# Patient Record
Sex: FEMALE | Race: BLACK OR AFRICAN AMERICAN | URBAN - METROPOLITAN AREA
[De-identification: names, ages, dates, MRNs, and addresses within clinical notes are randomized per-mention and may not be internally consistent; named-entity substitution may affect disease eponyms.]

---

## 2017-05-03 ENCOUNTER — OFFICE VISIT (OUTPATIENT)
Dept: FAMILY MEDICINE | Facility: CLINIC | Age: 47
End: 2017-05-03

## 2017-05-03 ENCOUNTER — OFFICE VISIT (OUTPATIENT)
Dept: PHARMACY | Facility: CLINIC | Age: 47
End: 2017-05-03

## 2017-05-03 VITALS
HEIGHT: 67 IN | SYSTOLIC BLOOD PRESSURE: 180 MMHG | RESPIRATION RATE: 20 BRPM | DIASTOLIC BLOOD PRESSURE: 121 MMHG | HEART RATE: 72 BPM | TEMPERATURE: 98.4 F | BODY MASS INDEX: 32.55 KG/M2 | OXYGEN SATURATION: 96 % | WEIGHT: 207.4 LBS

## 2017-05-03 DIAGNOSIS — R32 URINARY INCONTINENCE, UNSPECIFIED TYPE: ICD-10-CM

## 2017-05-03 DIAGNOSIS — I10 ESSENTIAL HYPERTENSION: Primary | ICD-10-CM

## 2017-05-03 DIAGNOSIS — M19.90 OSTEOARTHRITIS, UNSPECIFIED OSTEOARTHRITIS TYPE, UNSPECIFIED SITE: ICD-10-CM

## 2017-05-03 DIAGNOSIS — E78.5 HYPERLIPIDEMIA LDL GOAL <130: ICD-10-CM

## 2017-05-03 DIAGNOSIS — J45.40 MODERATE PERSISTENT ASTHMA WITHOUT COMPLICATION: ICD-10-CM

## 2017-05-03 DIAGNOSIS — I10 ESSENTIAL HYPERTENSION: ICD-10-CM

## 2017-05-03 DIAGNOSIS — K21.9 GASTROESOPHAGEAL REFLUX DISEASE WITHOUT ESOPHAGITIS: ICD-10-CM

## 2017-05-03 DIAGNOSIS — N92.6 IRREGULAR MENSTRUAL CYCLE: ICD-10-CM

## 2017-05-03 DIAGNOSIS — G47.30 SLEEP APNEA, UNSPECIFIED TYPE: Primary | ICD-10-CM

## 2017-05-03 DIAGNOSIS — N39.45 CONTINUOUS LEAKAGE OF URINE: ICD-10-CM

## 2017-05-03 DIAGNOSIS — M79.673 PAIN OF FOOT, UNSPECIFIED LATERALITY: ICD-10-CM

## 2017-05-03 LAB
ALBUMIN SERPL-MCNC: 4.3 MG/DL (ref 3.8–5)
ALP SERPL-CCNC: 53.5 U/L (ref 31.7–110.5)
ALT SERPL-CCNC: 25.3 U/L (ref 0–45)
AST SERPL-CCNC: 13.6 U/L (ref 0–45)
BILIRUB SERPL-MCNC: <0.4 MG/DL (ref 0.2–1.3)
BUN SERPL-MCNC: 8.7 MG/DL (ref 7–19)
CALCIUM SERPL-MCNC: 9.6 MG/DL (ref 8.5–10.1)
CHLORIDE SERPLBLD-SCNC: 103.6 MMOL/L (ref 98–110)
CHOLEST SERPL-MCNC: 262.8 MG/DL (ref 0–200)
CHOLEST/HDLC SERPL: 7.2 {RATIO} (ref 0–5)
CO2 SERPL-SCNC: 26.3 MMOL/L (ref 20–32)
CREAT SERPL-MCNC: 0.7 MG/DL (ref 0.5–1)
CREAT UR-MCNC: 160 MG/DL
GFR SERPL CREATININE-BSD FRML MDRD: >90 ML/MIN/1.7 M2
GLUCOSE SERPL-MCNC: 109.8 MG'DL (ref 70–99)
HBA1C MFR BLD: 6.2 % (ref 4.1–5.7)
HDLC SERPL-MCNC: 36.4 MG/DL
LDLC SERPL CALC-MCNC: 191 MG/DL (ref 0–129)
MICROALBUMIN UR-MCNC: 93 MG/L
MICROALBUMIN/CREAT UR: 58.06 MG/G CR (ref 0–25)
POTASSIUM SERPL-SCNC: 3.7 MMOL/DL (ref 3.3–4.5)
PROT SERPL-MCNC: 7.7 G/DL (ref 6.8–8.8)
SODIUM SERPL-SCNC: 138.3 MMOL/L (ref 132.6–141.4)
TRIGL SERPL-MCNC: 178.7 MG/DL (ref 0–150)
VLDL CHOLESTEROL: 35.7 MG/DL (ref 7–32)

## 2017-05-03 RX ORDER — MULTIVIT-MIN/IRON/FOLIC ACID/K 18-600-40
1000 CAPSULE ORAL DAILY
Qty: 30 TABLET | Refills: 11 | Status: SHIPPED | OUTPATIENT
Start: 2017-05-03 | End: 2019-11-20

## 2017-05-03 RX ORDER — DIPHENHYD/PHENYLEPH/ACETAMINOP 12.5-5-325
TABLET ORAL
Qty: 1 KIT | Refills: 0 | Status: SHIPPED | OUTPATIENT
Start: 2017-05-03 | End: 2017-06-01

## 2017-05-03 RX ORDER — OXYBUTYNIN CHLORIDE 5 MG/1
5 TABLET, EXTENDED RELEASE ORAL DAILY
Qty: 30 TABLET | Refills: 3 | Status: SHIPPED | OUTPATIENT
Start: 2017-05-03 | End: 2019-11-20

## 2017-05-03 RX ORDER — OXYBUTYNIN CHLORIDE 5 MG/1
5 TABLET, EXTENDED RELEASE ORAL DAILY
COMMUNITY
End: 2017-05-03

## 2017-05-03 RX ORDER — NAPROXEN SODIUM 220 MG
TABLET ORAL
Qty: 60 TABLET | Refills: 3 | Status: SHIPPED | OUTPATIENT
Start: 2017-05-03 | End: 2019-11-20

## 2017-05-03 RX ORDER — ALBUTEROL SULFATE 90 UG/1
2 AEROSOL, METERED RESPIRATORY (INHALATION) EVERY 6 HOURS
COMMUNITY
End: 2019-11-20

## 2017-05-03 RX ORDER — ATORVASTATIN CALCIUM 40 MG/1
40 TABLET, FILM COATED ORAL DAILY
Qty: 30 TABLET | Refills: 11 | Status: SHIPPED | OUTPATIENT
Start: 2017-05-03 | End: 2019-11-20 | Stop reason: DRUGHIGH

## 2017-05-03 RX ORDER — AMLODIPINE BESYLATE 5 MG/1
5 TABLET ORAL DAILY
Qty: 30 TABLET | Refills: 1 | Status: SHIPPED | OUTPATIENT
Start: 2017-05-03 | End: 2019-11-20

## 2017-05-03 RX ORDER — HYDROCHLOROTHIAZIDE 12.5 MG/1
12.5 TABLET ORAL DAILY
Qty: 30 TABLET | Refills: 6 | Status: SHIPPED | OUTPATIENT
Start: 2017-05-03 | End: 2019-11-20

## 2017-05-03 RX ORDER — FLUTICASONE PROPIONATE 50 MCG
1-2 SPRAY, SUSPENSION (ML) NASAL DAILY
Qty: 1 BOTTLE | Refills: 11 | Status: SHIPPED | OUTPATIENT
Start: 2017-05-03 | End: 2019-11-20

## 2017-05-03 RX ORDER — ATORVASTATIN CALCIUM 10 MG/1
40 TABLET, FILM COATED ORAL DAILY
Qty: 30 TABLET | Refills: 11 | Status: SHIPPED | OUTPATIENT
Start: 2017-05-03 | End: 2017-05-03 | Stop reason: DRUGHIGH

## 2017-05-03 RX ORDER — FAMOTIDINE 10 MG
20 TABLET ORAL DAILY
Qty: 60 TABLET | Refills: 3 | Status: SHIPPED | OUTPATIENT
Start: 2017-05-03 | End: 2019-11-20

## 2017-05-03 RX ORDER — IPRATROPIUM BROMIDE AND ALBUTEROL SULFATE 2.5; .5 MG/3ML; MG/3ML
1 SOLUTION RESPIRATORY (INHALATION) EVERY 6 HOURS PRN
COMMUNITY
End: 2019-11-20

## 2017-05-03 ASSESSMENT — ENCOUNTER SYMPTOMS
COUGH: 0
FATIGUE: 1
NERVOUS/ANXIOUS: 1
ARTHRALGIAS: 1
FEVER: 0
DIZZINESS: 0
ACTIVITY CHANGE: 0
SLEEP DISTURBANCE: 0
DYSPHORIC MOOD: 1
BACK PAIN: 1
CHEST TIGHTNESS: 0
SHORTNESS OF BREATH: 0
NECK PAIN: 1
WHEEZING: 0
MYALGIAS: 1
APPETITE CHANGE: 0
WEAKNESS: 0
JOINT SWELLING: 1
PALPITATIONS: 0

## 2017-05-03 NOTE — PATIENT INSTRUCTIONS
Here is the plan from today's visit    1. Sleep apnea, unspecified type  Schedule appointment with sleep clinic  - SLEEP EVALUATION & MANAGEMENT REFERRAL - ADULT; Future    2. Hyperlipidemia LDL goal <130  Check labs today  Will refill medications  - Lipid Panel ([facility])    3. Essential hypertension  Resume your HCTZ  F/u in 1 week with PharmD team to check if further adjustments in meds are needed  - Comprehensive Metabolic Panel ([facility])  - Lipid Panel ([facility])  - Hemoglobin A1c ([facility])  - TSH with free T4 reflex  - Albumin Random Urine Quantitative    4. Continuous leakage of urine  Bring Rx to [facility]  - ADULT SIZE PULL-ON LG    5. Moderate persistent asthma without complication  Refills    6. Irregular menstrual cycle  Perimenopausal    7. Osteoarthritis, unspecified osteoarthritis type, unspecified site  PRN tylenol or ibuprofen  Regular exercise  RTC to discuss back symptoms - figure out PT      Please call or return to clinic if your symptoms don't go away.    Follow up plan  1 week f/u with PharmD team re: BP   2-3 weeks with Dr. Weiner to continue working on your list of concerns    Thank you for coming to [facility] today.  Lab Testing:  **If you had lab testing today and your results are reassuring or normal they will be mailed to you or sent through [employer] within 7 days.   **If the lab tests need quick action we will call you with the results.  The phone number we will call with results is # [phone] (home) . If this is not the best number please call our clinic and change the number.  Medication Refills:  If you need any refills please call your pharmacy and they will contact us.   If you need to  your refill at a new pharmacy, please contact the new pharmacy directly. The new pharmacy will help you get your medications transferred faster.   Scheduling:  If you have any concerns about today's visit or wish to schedule another appointment please call our office during  normal business hours [phone] (8-5:00 M-F)  If a referral was made to a [facility] and you don't get a call from central scheduling please call [phone].  If a Mammogram was ordered for you at [facility] call [phone] to schedule or change your appointment.  If you had an XRay/CT/Ultrasound/MRI ordered the number is [phone] to schedule or change your radiology appointment.   Medical Concerns:  If you have urgent medical concerns please call [phone] at any time of the day.  If you have a medical emergency please call [phone].

## 2017-05-03 NOTE — PROGRESS NOTES
"      HPI:       Wilfrid Louise Sorenson is a 47 year old who presents for the following  Patient presents with:  Establish Care: New Pt      Here to establish care - many things!  Moved to [place] in September from [place]  - hasn't been able to settle in to a new provider yet  - wants to be part of the [facility] system  - Several physical complaints, not on all her meds yet    Problem list:  HTN  Asthma  High cholesterol  Migraine-variant HA  Sleep apnea (testing done in [place])  - diagnosed several yrs ago, had to return equipment when she left [place], doesn't know her CPAP settings  - frequent awakening at night, snores \"all the time\"    Menopause - 4 months no menses  - 8 kids, 4 c/s and 4  (18 grandchildren)  Incontinence issues (wears diapers and pads daily)  - no work up for type of incontinence, hasn't discussed other treatments  - needs refill for Detrol LA    Intermittent \"chest pains\" (non-exertional)  - told she had a \"cyst on chest\"  - not sure if she needs a \"study\" done  - big FHx of stroke, DM, HTN    Borderline DM  - wants to be retested    Neuropathy both feet  - longstanding, not on meds for that    Osteoarthritis - uses a cane for balance  - body aches \"all over\"    Back pain  - neck down to low back, hips, knees  - \"arthritis\"  - wants to be in PT eventually    Mood  - sometimes feels down (\"too much going on\")  - no self-harm  - no SI  - never been treated before    Problem, Medication and Allergy Lists were   reviewed and are current.     Patient Active Problem List    Diagnosis Date Noted     Osteoarthritis, unspecified osteoarthritis type, unspecified site 2017     Priority: Medium     Irregular menstrual cycle 2017     Priority: Medium     Moderate persistent asthma without complication 2017     Priority: Medium     Continuous leakage of urine 2017     Priority: Medium     Essential hypertension 2017     Priority: Medium     Hyperlipidemia LDL goal <130 2017     " Priority: Medium     Sleep apnea, unspecified type 05/03/2017     Priority: Medium         Current Outpatient Prescriptions   Medication Sig Dispense Refill     ACETAMINOPHEN PO Take 500 mg by mouth       albuterol (PROAIR HFA/PROVENTIL HFA/VENTOLIN HFA) 108 (90 BASE) MCG/ACT Inhaler Inhale 2 puffs into the lungs every 6 hours       ipratropium - albuterol 0.5 mg/2.5 mg/3 mL (DUONEB) 0.5-2.5 (3) MG/3ML neb solution Take 1 vial by nebulization every 6 hours as needed       mometasone-formoterol (DULERA) 200-5 MCG/ACT oral inhaler Inhale 2 puffs into the lungs 2 times daily       NAPROXEN PO Take 250 mg by mouth       hydrochlorothiazide 12.5 MG TABS tablet Take 1 tablet (12.5 mg) by mouth daily 30 tablet 6     famotidine (PEPCID) 10 MG tablet Take 2 tablets (20 mg) by mouth daily 60 tablet 3     amLODIPine (NORVASC) 5 MG tablet Take 1 tablet (5 mg) by mouth daily 30 tablet 1     fluticasone (FLONASE) 50 MCG/ACT spray Spray 1-2 sprays into both nostrils daily 1 Bottle 11     oxybutynin (DITROPAN-XL) 5 MG 24 hr tablet Take 1 tablet (5 mg) by mouth daily 30 tablet 3     Vitamin D, Cholecalciferol, 1000 UNITS TABS Take 1,000 Units by mouth daily 30 tablet 11     naproxen sodium (ANAPROX) 220 MG tablet Take one tablet once daily, may increase to twice daily if needed for pain 60 tablet 3     Blood Pressure Monitoring (BLOOD PRESSURE KIT) KIT Use daily 1 kit 0     atorvastatin (LIPITOR) 40 MG tablet Take 1 tablet (40 mg) by mouth daily 30 tablet 11     [DISCONTINUED] ATORVASTATIN CALCIUM PO Take 40 mg by mouth       [DISCONTINUED] atorvastatin (LIPITOR) 10 MG tablet Take 4 tablets (40 mg) by mouth daily 30 tablet 11         Allergies   Allergen Reactions     Iodine Other (See Comments)     Burning sensation     Ciprofloxacin Rash     Patient is a new patient to this clinic and so  I reviewed/updated the Past Medical History, the Family History and the Social History. ,   Past Medical History:   Diagnosis Date     Arthritis  "     COPD (chronic obstructive pulmonary disease) (H)      Essential hypertension 5/3/2017     Uncomplicated asthma    ,   Family History     Problem (# of Occurrences) Relation (Name,Age of Onset)    Aneurysm (1) Father    Arrhythmia (1) Mother: pacemaker    Asthma (2) Mother, Father    CEREBROVASCULAR DISEASE (1) Father: 3 strokes    DIABETES (1) Mother    Hyperlipidemia (2) Mother, Father    Hypertension (1) Mother    Peripheral Vascular Disease (1) Mother    Sleep Apnea (1) Father       Negative family history of: CANCER       and   Social History     Social History     Marital status: Single     Spouse name: N/A     Number of children: N/A     Years of education: N/A     Social History Main Topics     Smoking status: Current Every Day Smoker     Years: 10.00     Types: Cigarettes     Smokeless tobacco: None     Alcohol use Yes     Drug use: No     Sexual activity: Yes     Partners: Male     Birth control/ protection: None          Review of Systems:   Review of Systems   Constitutional: Positive for fatigue. Negative for activity change, appetite change and fever.   Eyes: Negative for visual disturbance.   Respiratory: Negative for cough, chest tightness, shortness of breath and wheezing.    Cardiovascular: Positive for chest pain. Negative for palpitations and leg swelling.   Genitourinary: Positive for enuresis and menstrual problem.   Musculoskeletal: Positive for arthralgias, back pain, joint swelling (\"sometimes my knees\"), myalgias and neck pain.   Neurological: Negative for dizziness and weakness.   Psychiatric/Behavioral: Positive for dysphoric mood (sometimes). Negative for sleep disturbance and suicidal ideas. The patient is nervous/anxious (mostly about her physical complaints).              Physical Exam:   No data found.    There is no height or weight on file to calculate BMI.  Vitals were reviewed and were normal     Physical Exam   Constitutional: She is oriented to person, place, and time. She " appears well-developed and well-nourished.   Cardiovascular: Normal rate and regular rhythm.    Pulmonary/Chest: Effort normal and breath sounds normal.   Musculoskeletal: Normal range of motion.   Walking with a cane   Neurological: She is alert and oriented to person, place, and time.   Psychiatric: She has a normal mood and affect. Her behavior is normal. Judgment and thought content normal.     MENTAL STATUS EXAM  Appearance: appropriate  Attitude: cooperative  Behavior: normal  Eye Contact: normal  Speech: normal  Orientation: oriented to person, place, time and situation  Mood: normal  Affect: Congruent with mood  Thought Process: a little tangential  Suicidal Ideation: reports no suicidal ideation  Hallucination: denies      Results:     Pending    Assessment and Plan     Patient Instructions   Here is the plan from today's visit    1. Sleep apnea, unspecified type  Schedule appointment with sleep clinic  - SLEEP EVALUATION & MANAGEMENT REFERRAL - ADULT; Future    2. Hyperlipidemia LDL goal <130  Check labs today  Will refill medications  - Lipid Panel ([facility])    3. Essential hypertension  Resume your HCTZ  F/u in 1 week with PharmD team to check if further adjustments in meds are needed  - Comprehensive Metabolic Panel ([facility])  - Lipid Panel ([facility])  - Hemoglobin A1c ([facility])  - TSH with free T4 reflex  - Albumin Random Urine Quantitative    4. Continuous leakage of urine  Bring Rx to [facility]  - ADULT SIZE PULL-ON LG    5. Moderate persistent asthma without complication  Refills    6. Irregular menstrual cycle  Perimenopausal    7. Osteoarthritis, unspecified osteoarthritis type, unspecified site  PRN tylenol or ibuprofen  Regular exercise  RTC to discuss back symptoms - figure out PT      Please call or return to clinic if your symptoms don't go away.    Follow up plan  1 week f/u with PharmD team re: BP   2-3 weeks with Dr. Weiner to continue working on your list of concerns      There are  no discontinued medications.  Options for treatment and follow-up care were reviewed with the patient. Wilfrid Sorenson  engaged in the decision making process and verbalized understanding of the options discussed and agreed with the final plan.    Ashley Weiner MD

## 2017-05-03 NOTE — PROGRESS NOTES
"  Clinical Pharmacy Note     Wilfrid Gil was referred by Dr. Weiner for pharmacy services for MTM      Med List Review:  Discussed today medication indications, dosage and effectiveness.    Patient does not bring their medication to the visit today.  Discussed use of OTC meds today: none    Patient reported being compliant all of the time   Patient reports some side effects including regarding lisinopril.      Subjective:  Wilfrid Gil is here today to establish with Dr. Weiner.  She has recently moved to [place] and was seen in [facility] for the past few months.    Her current pharmacy has been the [facility] pharmacy.  She would like to change pharmacies to the [facility] pharmacy.      1) HTN  ;     Previously treated with enalapril and more recently with lisinopril.    Previously treated with HCTZ and she refers to this as her water pill    Previously treated with Amlodipine (Norvasc) and does not remember any poor experience.        Denies any MODESTO with amlodipine    She has some low grade edema in her feet.    2) Asthma  ;    Currently has nebulizer at home and neb solution at home.  She also currently has both the symbacort and albuterol inhalers at home.    Uses her rescue inhaler or Neb about 3 times wer weeks.        Refills needed:  Flonase.  oxybutinin    Vit D  Atorvastatin   Famotidine    Pain:  APAP has not been helping for her pain. At her last [facility] visit she was told to stop her Naproxen and only use the APAP.  She has been holding her Naproxen, but her foot and leg pain continue to have pain that has not been helped.        Objective:    BP (!) 180/121  Pulse 72  Temp 98.4  F (36.9  C) (Oral)  Resp 20  Ht 5' 7\" (170.2 cm)  Wt 207 lb 6.4 oz (94.1 kg)  SpO2 96%  Breastfeeding? No  BMI 32.48 kg/m2  BP Readings from Last 6 Encounters:   05/03/17 (!) 180/121     CrCl cannot be calculated (No order found.).  No results found for: GFRESTIMATED  No results found for: " GFRESTBLACK        There were no vitals taken for this visit.    Drug Therapy Assessment:  Drug therapy problems identified:     HTN  Started  HCTZ 12.5 and Amlodipine 5 mg daiy      Not on ASA:  But buy guidelines I would not start this until the pt is over age 60.  There was a recent ED visit where her troponins were elevated, so may require some more consideration.    Asthma:  Continue on current inhaler therapy.       All medications were reviewed and found to be indicated, effective, safe and convenient unless drug therapy problem identified as described above.        Drug Therapy Plan and Follow up:    Plan    HCTZ 12.5 mg daily    Amlodipine 5 mg daily       Follow up: 1 week  Patient was provided with written instructions/medication list via avs        Options for treatment and/or follow-up care were reviewed with the patient. Patient was engaged and actively involved in the decision making process.  Wilfrid Sorenson verbalized understanding of the options discussed and was satisfied with the final plan.      Ashley Hart  was provided my action  in clinic today and  was available for supervision during this visit and is the authorizing prescriber for this visit through the pharmacist collaborative practice agreement.      Nino Washington, Pharm.D             Total Time: 30  # DTPs Identified: 2    Medical Condition 1: HTN, Goals of therapy: Not at goal, Drug Class: Diuretic, Indication: Needs additional drug therapy,  ,  ,  , Intervention: Initiate drug, Verification: Provider Agreed  Medical Condition 2: HTN, Goals of therapy 2: Not at goal, Drug Class 2: CCB, Indication 2: Needs additional drug therapy,  ,  ,  , Intervention 2: Initiate drug, Verification 2: Provider Agreed   ,  ,  ,  ,  ,  ,  ,  ,     ,  ,  ,                     ,  ,  ,  ,  ,  ,  ,  ,     ,  ,  ,  ,  ,  ,  ,  ,     ,  ,  ,  ,  ,  ,  ,  ,     ,  ,  ,

## 2017-05-03 NOTE — MR AVS SNAPSHOT
After Visit Summary   5/3/2017    Wilfrid Sorenson    MRN: [id number]           Patient Information     Date Of Birth          1970        Visit Information        Provider Department      5/3/2017 10:40 AM Ashley Weiner MD [facility]        Today's Diagnoses     Sleep apnea, unspecified type    -  1    Hyperlipidemia LDL goal <130        Essential hypertension        Continuous leakage of urine        Moderate persistent asthma without complication        Irregular menstrual cycle        Osteoarthritis, unspecified osteoarthritis type, unspecified site        Gastroesophageal reflux disease without esophagitis        Urinary incontinence, unspecified type        Pain of foot, unspecified laterality          Care Instructions    Here is the plan from today's visit    1. Sleep apnea, unspecified type  Schedule appointment with sleep clinic  - SLEEP EVALUATION & MANAGEMENT REFERRAL - ADULT; Future    2. Hyperlipidemia LDL goal <130  Check labs today  Will refill medications  - Lipid Panel ([facility])    3. Essential hypertension  Resume your HCTZ  F/u in 1 week with PharmD team to check if further adjustments in meds are needed  - Comprehensive Metabolic Panel ([facility])  - Lipid Panel ([facility])  - Hemoglobin A1c ([facility])  - TSH with free T4 reflex  - Albumin Random Urine Quantitative    4. Continuous leakage of urine  Bring Rx to [facility]  - ADULT SIZE PULL-ON LG    5. Moderate persistent asthma without complication  Refills    6. Irregular menstrual cycle  Perimenopausal    7. Osteoarthritis, unspecified osteoarthritis type, unspecified site  PRN tylenol or ibuprofen  Regular exercise  RTC to discuss back symptoms - figure out PT      Please call or return to clinic if your symptoms don't go away.    Follow up plan  1 week f/u with PharmD team re: BP   2-3 weeks with Dr. Weiner to continue working on your list of concerns    Thank you for coming to [facility]  today.  Lab Testing:  **If you had lab testing today and your results are reassuring or normal they will be mailed to you or sent through [employer] within 7 days.   **If the lab tests need quick action we will call you with the results.  The phone number we will call with results is # [phone] (home) . If this is not the best number please call our clinic and change the number.  Medication Refills:  If you need any refills please call your pharmacy and they will contact us.   If you need to  your refill at a new pharmacy, please contact the new pharmacy directly. The new pharmacy will help you get your medications transferred faster.   Scheduling:  If you have any concerns about today's visit or wish to schedule another appointment please call our office during normal business hours [phone] (8-5:00 M-F)  If a referral was made to a [facility] and you don't get a call from central scheduling please call [phone].  If a Mammogram was ordered for you at [facility] call [phone] to schedule or change your appointment.  If you had an XRay/CT/Ultrasound/MRI ordered the number is [phone] to schedule or change your radiology appointment.   Medical Concerns:  If you have urgent medical concerns please call [phone] at any time of the day.  If you have a medical emergency please call [phone].          Follow-ups after your visit        Additional Services     SLEEP EVALUATION & MANAGEMENT REFERRAL - ADULT       Please be aware that coverage of these services is subject to the terms and limitations of your health insurance plan.  Call member services at your health plan with any benefit or coverage questions.      Please bring the following to your appointment:    >>   List of current medications   >>   This referral request   >>   Any documents/labs given to you for this referral    [facility]  Ph [phone] (Age 15 and up)                 "  Future tests that were ordered for you today     Open Future Orders        Priority Expected Expires Ordered    SLEEP EVALUATION & MANAGEMENT REFERRAL - ADULT Routine  5/3/2018 5/3/2017            Who to contact     Please call your clinic at [phone] to:    Ask questions about your health    Make or cancel appointments    Discuss your medicines    Learn about your test results    Speak to your doctor   If you have compliments or concerns about an experience at your clinic, or if you wish to file a complaint, please contact [facility] Patient Relations at [phone] or email us at [email or web address]         Additional Information About Your Visit        American Board of Addiction Medicine (ABAM)harmyVBO Information     [employer] is an electronic gateway that provides easy, online access to your medical records. With [employer], you can request a clinic appointment, read your test results, renew a prescription or communicate with your care team.     To sign up for [employer] visit the website at [email or web address]   You will be asked to enter the access code listed below, as well as some personal information. Please follow the directions to create your username and password.     Your access code is: [email or web address]  Expires: 2017 12:15 PM     Your access code will  in 90 days. If you need help or a new code, please contact your [facility] or call [phone] for assistance.        Care EveryWhere ID     This is your Care EveryWhere ID. This could be used by other organizations to access your [place] medical records  [id number]        Your Vitals Were     Pulse Temperature Respirations Height Pulse Oximetry Breastfeeding?    72 98.4  F (36.9  C) (Oral) 20 5' 7\" (170.2 cm) 96% No    BMI (Body Mass Index)                   32.48 kg/m2            Blood Pressure from Last 3 Encounters:   17 (!) 180/121    Weight from Last 3 Encounters:   17 207 lb 6.4 oz (94.1 " kg)              We Performed the Following     ADULT SIZE PULL-ON LG     Albumin Random Urine Quantitative     Comprehensive Metabolic Panel ([facility])     Hemoglobin A1c ([facility])     Lipid Panel ([facility])     TSH with free T4 reflex          Today's Medication Changes          These changes are accurate as of: 5/3/17 12:15 PM.  If you have any questions, ask your nurse or doctor.               Start taking these medicines.        Dose/Directions    amLODIPine 5 MG tablet   Commonly known as:  NORVASC   Used for:  Essential hypertension   Started by:  Ashley Weiner MD        Dose:  5 mg   Take 1 tablet (5 mg) by mouth daily   Quantity:  30 tablet   Refills:  1       blood pressure kit Kit   Used for:  Essential hypertension   Started by:  Ashley Weiner MD        Use daily   Quantity:  1 kit   Refills:  0       fluticasone 50 MCG/ACT spray   Commonly known as:  FLONASE   Used for:  Moderate persistent asthma without complication   Started by:  Ashley Weiner MD        Dose:  1-2 spray   Spray 1-2 sprays into both nostrils daily   Quantity:  1 Bottle   Refills:  11       hydrochlorothiazide 12.5 MG Tabs tablet   Used for:  Essential hypertension   Started by:  Ashley Weiner MD        Dose:  12.5 mg   Take 1 tablet (12.5 mg) by mouth daily   Quantity:  30 tablet   Refills:  6       naproxen sodium 220 MG tablet   Commonly known as:  ANAPROX   Used for:  Pain of foot, unspecified laterality   Started by:  Ashley Weiner MD        Take one tablet once daily, may increase to twice daily if needed for pain   Quantity:  60 tablet   Refills:  3         These medicines have changed or have updated prescriptions.        Dose/Directions    atorvastatin 10 MG tablet   Commonly known as:  LIPITOR   This may have changed:  when to take this   Used for:  Hyperlipidemia LDL goal <130   Changed by:  Ashley Weiner MD        Dose:  40 mg   Take 4 tablets (40 mg) by mouth daily   Quantity:  30 tablet   Refills:   11       famotidine 10 MG tablet   Commonly known as:  PEPCID   This may have changed:    - medication strength  - when to take this   Used for:  Gastroesophageal reflux disease without esophagitis   Changed by:  Ashley Weiner MD        Dose:  20 mg   Take 2 tablets (20 mg) by mouth daily   Quantity:  60 tablet   Refills:  3       Vitamin D (Cholecalciferol) 1000 UNITS Tabs   This may have changed:  medication strength   Used for:  Osteoarthritis, unspecified osteoarthritis type, unspecified site   Changed by:  Ashley Weiner MD        Dose:  1000 Units   Take 1,000 Units by mouth daily   Quantity:  30 tablet   Refills:  11         Stop taking these medicines if you haven't already. Please contact your care team if you have questions.     LISINOPRIL PO   Stopped by:  Ashley Weiner MD           RANITIDINE HCL PO   Stopped by:  Ashley Weiner MD                Where to get your medicines      These medications were sent to [facility] [place], [place] - [address] [facility] [address]     Phone:  [phone]     amLODIPine 5 MG tablet    atorvastatin 10 MG tablet    blood pressure kit Kit    famotidine 10 MG tablet    fluticasone 50 MCG/ACT spray    hydrochlorothiazide 12.5 MG Tabs tablet    naproxen sodium 220 MG tablet    oxybutynin 5 MG 24 hr tablet    Vitamin D (Cholecalciferol) 1000 UNITS Tabs                Primary Care Provider Office Phone # Fax #    Ashley Weiner [phone]       [facility] [address] [address]        Thank you!     Thank you for choosing [facility]  for your care. Our goal is always to provide you with excellent care. Hearing back from our patients is one way we can continue to improve our services. Please take a few minutes to complete the written survey that you may receive in the mail after your visit with us. Thank you!             Your Updated Medication List -  Protect others around you: Learn how to safely use, store and throw away your medicines at www.disposemymeds.org.          This list is accurate as of: 5/3/17 12:15 PM.  Always use your most recent med list.                   Brand Name Dispense Instructions for use    ACETAMINOPHEN PO      Take 500 mg by mouth       albuterol 108 (90 BASE) MCG/ACT Inhaler    PROAIR HFA/PROVENTIL HFA/VENTOLIN HFA     Inhale 2 puffs into the lungs every 6 hours       amLODIPine 5 MG tablet    NORVASC    30 tablet    Take 1 tablet (5 mg) by mouth daily       atorvastatin 10 MG tablet    LIPITOR    30 tablet    Take 4 tablets (40 mg) by mouth daily       blood pressure kit Kit     1 kit    Use daily       famotidine 10 MG tablet    PEPCID    60 tablet    Take 2 tablets (20 mg) by mouth daily       fluticasone 50 MCG/ACT spray    FLONASE    1 Bottle    Spray 1-2 sprays into both nostrils daily       hydrochlorothiazide 12.5 MG Tabs tablet     30 tablet    Take 1 tablet (12.5 mg) by mouth daily       ipratropium - albuterol 0.5 mg/2.5 mg/3 mL 0.5-2.5 (3) MG/3ML neb solution    DUONEB     Take 1 vial by nebulization every 6 hours as needed       mometasone-formoterol 200-5 MCG/ACT oral inhaler    DULERA     Inhale 2 puffs into the lungs 2 times daily       NAPROXEN PO      Take 250 mg by mouth       naproxen sodium 220 MG tablet    ANAPROX    60 tablet    Take one tablet once daily, may increase to twice daily if needed for pain       oxybutynin 5 MG 24 hr tablet    DITROPAN-XL    30 tablet    Take 1 tablet (5 mg) by mouth daily       Vitamin D (Cholecalciferol) 1000 UNITS Tabs     30 tablet    Take 1,000 Units by mouth daily

## 2017-05-03 NOTE — PROGRESS NOTES
Started  HCTZ 12.5 and Amlodipine 5 mg daiy  Fu in one week.    Please see pharmacy note dated same day.    Sanju CatesD.

## 2017-05-03 NOTE — MR AVS SNAPSHOT
After Visit Summary   5/3/2017    Wilfrid Sorenson    MRN: [id number]           Patient Information     Date Of Birth          1970        Visit Information        Provider Department      5/3/2017 11:00 AM [facility]        Today's Diagnoses     Essential hypertension    -  1    Moderate persistent asthma without complication           Follow-ups after your visit        Your next 10 appointments already scheduled     2017 10:00 AM CDT   New Sleep Patient with Rema Bah MD   [facility] [place], Sleep Study ([facility])    [address] [place] [id number] [phone]              Who to contact     Please call your clinic at [phone] to:    Ask questions about your health    Make or cancel appointments    Discuss your medicines    Learn about your test results    Speak to your doctor   If you have compliments or concerns about an experience at your clinic, or if you wish to file a complaint, please contact [facility] Patient Relations at [phone] or email us at [email or web address]         Additional Information About Your Visit        MyChart Information     [employer] is an electronic gateway that provides easy, online access to your medical records. With [employer], you can request a clinic appointment, read your test results, renew a prescription or communicate with your care team.     To sign up for [employer] visit the website at [email or web address]   You will be asked to enter the access code listed below, as well as some personal information. Please follow the directions to create your username and password.     Your access code is: [email or web address]  Expires: 2017 12:15 PM     Your access code will  in 90 days. If you need help or a new code, please contact your [facility] or call [phone]  for assistance.        Care EveryWhere ID     This is your Care EveryWhere ID. This could be used by other organizations to access your [place] medical records  [id number]         Blood Pressure from Last 3 Encounters:   05/10/17 (!) 170/111   05/03/17 (!) 180/121    Weight from Last 3 Encounters:   05/10/17 200 lb (90.7 kg)   05/03/17 207 lb 6.4 oz (94.1 kg)              We Performed the Following     MEDICATION THERAPY, FACE TO FACE,  EA ADDITIONAL 15 MIN     MEDICATION THERAPY, FACE TO FACE, 1ST 15 MIN NEW PATIENT          Today's Medication Changes          These changes are accurate as of: 5/3/17 11:59 PM.  If you have any questions, ask your nurse or doctor.               Start taking these medicines.        Dose/Directions    amLODIPine 5 MG tablet   Commonly known as:  NORVASC   Used for:  Essential hypertension   Started by:  Ashley Weiner MD        Dose:  5 mg   Take 1 tablet (5 mg) by mouth daily   Quantity:  30 tablet   Refills:  1       blood pressure kit Kit   Used for:  Essential hypertension   Started by:  Ashley Weiner MD        Use daily   Quantity:  1 kit   Refills:  0       fluticasone 50 MCG/ACT spray   Commonly known as:  FLONASE   Used for:  Moderate persistent asthma without complication   Started by:  Ashley Weiner MD        Dose:  1-2 spray   Spray 1-2 sprays into both nostrils daily   Quantity:  1 Bottle   Refills:  11       hydrochlorothiazide 12.5 MG Tabs tablet   Used for:  Essential hypertension   Started by:  Ashley Weiner MD        Dose:  12.5 mg   Take 1 tablet (12.5 mg) by mouth daily   Quantity:  30 tablet   Refills:  6       naproxen sodium 220 MG tablet   Commonly known as:  ANAPROX   Used for:  Pain of foot, unspecified laterality   Started by:  Ashley Weiner MD        Take one tablet once daily, may increase to twice daily if needed for pain   Quantity:  60 tablet   Refills:  3         These medicines have changed or have updated prescriptions.         Dose/Directions    atorvastatin 40 MG tablet   Commonly known as:  LIPITOR   This may have changed:    - medication strength  - when to take this   Used for:  Hyperlipidemia LDL goal <130   Changed by:  Ashley Weiner MD        Dose:  40 mg   Take 1 tablet (40 mg) by mouth daily   Quantity:  30 tablet   Refills:  11       famotidine 10 MG tablet   Commonly known as:  PEPCID   This may have changed:    - medication strength  - when to take this   Used for:  Gastroesophageal reflux disease without esophagitis   Changed by:  Ashley Weiner MD        Dose:  20 mg   Take 2 tablets (20 mg) by mouth daily   Quantity:  60 tablet   Refills:  3       Vitamin D (Cholecalciferol) 1000 UNITS Tabs   This may have changed:  medication strength   Used for:  Osteoarthritis, unspecified osteoarthritis type, unspecified site   Changed by:  Ashley Weiner MD        Dose:  1000 Units   Take 1,000 Units by mouth daily   Quantity:  30 tablet   Refills:  11         Stop taking these medicines if you haven't already. Please contact your care team if you have questions.     LISINOPRIL PO   Stopped by:  Ashley Weiner MD           NAPROXEN PO   Stopped by:  [facility]           RANITIDINE HCL PO   Stopped by:  Ashley Weiner MD                Where to get your medicines      These medications were sent to [facility] [place], [place] - [address] [facility] [address]     Phone:  [phone]     amLODIPine 5 MG tablet    atorvastatin 40 MG tablet    blood pressure kit Kit    famotidine 10 MG tablet    fluticasone 50 MCG/ACT spray    hydrochlorothiazide 12.5 MG Tabs tablet    naproxen sodium 220 MG tablet    oxybutynin 5 MG 24 hr tablet    Vitamin D (Cholecalciferol) 1000 UNITS Tabs                Primary Care Provider Office Phone # Fax #    Ashley Weiner [phone]       [facility] [address] [address]        Thank you!      Thank you for choosing [facility]  for your care. Our goal is always to provide you with excellent care. Hearing back from our patients is one way we can continue to improve our services. Please take a few minutes to complete the written survey that you may receive in the mail after your visit with us. Thank you!             Your Updated Medication List - Protect others around you: Learn how to safely use, store and throw away your medicines at www.disposemymeds.org.          This list is accurate as of: 5/3/17 11:59 PM.  Always use your most recent med list.                   Brand Name Dispense Instructions for use    ACETAMINOPHEN PO      Take 500 mg by mouth       albuterol 108 (90 BASE) MCG/ACT Inhaler    PROAIR HFA/PROVENTIL HFA/VENTOLIN HFA     Inhale 2 puffs into the lungs every 6 hours       amLODIPine 5 MG tablet    NORVASC    30 tablet    Take 1 tablet (5 mg) by mouth daily       atorvastatin 40 MG tablet    LIPITOR    30 tablet    Take 1 tablet (40 mg) by mouth daily       blood pressure kit Kit     1 kit    Use daily       famotidine 10 MG tablet    PEPCID    60 tablet    Take 2 tablets (20 mg) by mouth daily       fluticasone 50 MCG/ACT spray    FLONASE    1 Bottle    Spray 1-2 sprays into both nostrils daily       hydrochlorothiazide 12.5 MG Tabs tablet     30 tablet    Take 1 tablet (12.5 mg) by mouth daily       ipratropium - albuterol 0.5 mg/2.5 mg/3 mL 0.5-2.5 (3) MG/3ML neb solution    DUONEB     Take 1 vial by nebulization every 6 hours as needed       mometasone-formoterol 200-5 MCG/ACT oral inhaler    DULERA     Inhale 2 puffs into the lungs 2 times daily       naproxen sodium 220 MG tablet    ANAPROX    60 tablet    Take one tablet once daily, may increase to twice daily if needed for pain       oxybutynin 5 MG 24 hr tablet    DITROPAN-XL    30 tablet    Take 1 tablet (5 mg) by mouth daily       Vitamin D (Cholecalciferol) 1000 UNITS Tabs     30 tablet    Take 1,000 Units  by mouth daily

## 2017-05-03 NOTE — LETTER
May 31, 2017      Wilfrid Sorenson  [address] [address]        Dear Wilfrid Gil,    Thank you for getting your care at [facility]. Please see below for your test results.  Your sugar is a tad high, but the A1c is in good shape.  Your kidney studies are normal but your cholesterol is a tad high (we can discuss at a future visit).  I'm glad you came in to see the PharmD team (Esther Machado and [facility]).  I'd like you back again soon so we can see how your BP is doing.    Resulted Orders   Comprehensive Metabolic Panel (Located within [facility])   Result Value Ref Range    Albumin 4.3 3.8 - 5.0 mg/dL    Alkaline Phosphatase 53.5 31.7 - 110.5 U/L    ALT 25.3 0.0 - 45.0 U/L    AST 13.6 0.0 - 45.0 U/L    Bilirubin Total <0.4 0.2 - 1.3 mg/dL    Urea Nitrogen 8.7 7.0 - 19.0 mg/dL    Calcium 9.6 8.5 - 10.1 mg/dL    Chloride 103.6 98.0 - 110.0 mmol/L    Carbon Dioxide 26.3 20.0 - 32.0 mmol/L    Creatinine 0.7 0.5 - 1.0 mg/dL    Glucose 109.8 (H) 70.0 - 99.0 mg'dL    Potassium 3.7 3.3 - 4.5 mmol/dL    Sodium 138.3 132.6 - 141.4 mmol/L    Protein Total 7.7 6.8 - 8.8 g/dL    GFR Estimate >90 >60.0 mL/min/1.7 m2    GFR Estimate If Black >90 >60.0 mL/min/1.7 m2   Lipid Panel ([facility])   Result Value Ref Range    Cholesterol 262.8 (H) 0.0 - 200.0 mg/dL    Cholesterol/HDL Ratio 7.2 (H) 0.0 - 5.0    HDL Cholesterol 36.4 (L) >40.0 mg/dL    LDL Cholesterol Calculated 191 (H) 0 - 129 mg/dL    Triglycerides 178.7 (H) 0.0 - 150.0 mg/dL    VLDL Cholesterol 35.7 (H) 7.0 - 32.0 mg/dL   Hemoglobin A1c ([facility])   Result Value Ref Range    Hemoglobin A1C 6.2 (H) 4.1 - 5.7 %   TSH with free T4 reflex   Result Value Ref Range    TSH 0.34 (L) 0.40 - 4.00 mU/L   Albumin Random Urine Quantitative   Result Value Ref Range    Creatinine Urine 160 mg/dL    Albumin Urine mg/L 93 mg/L    Albumin Urine mg/g Cr 58.06 (H) 0 - 25 mg/g Cr   T4 free   Result Value Ref Range    T4 Free 1.12 0.76 - 1.46 ng/dL       If you have any  concerns about these results please call and leave a message for me or send a MyChart message to the clinic.    Sincerely,    Ashley Weiner MD

## 2017-05-04 LAB
T4 FREE SERPL-MCNC: 1.12 NG/DL (ref 0.76–1.46)
TSH SERPL DL<=0.005 MIU/L-ACNC: 0.34 MU/L (ref 0.4–4)

## 2017-05-10 ENCOUNTER — OFFICE VISIT (OUTPATIENT)
Dept: PHARMACY | Facility: CLINIC | Age: 47
End: 2017-05-10

## 2017-05-10 VITALS
DIASTOLIC BLOOD PRESSURE: 111 MMHG | OXYGEN SATURATION: 97 % | SYSTOLIC BLOOD PRESSURE: 170 MMHG | BODY MASS INDEX: 31.32 KG/M2 | TEMPERATURE: 98.5 F | HEART RATE: 84 BPM | RESPIRATION RATE: 16 BRPM | WEIGHT: 200 LBS

## 2017-05-10 DIAGNOSIS — I10 ESSENTIAL HYPERTENSION: Primary | ICD-10-CM

## 2017-05-10 DIAGNOSIS — K21.9 GASTROESOPHAGEAL REFLUX DISEASE WITHOUT ESOPHAGITIS: ICD-10-CM

## 2017-05-10 DIAGNOSIS — N39.45 CONTINUOUS LEAKAGE OF URINE: ICD-10-CM

## 2017-05-10 NOTE — PROGRESS NOTES
Clinical Pharmacy Note     Wilfrid Gil was referred by Dr. Weiner for pharmacy services for medication therapy management.      MEDICATION REVIEW:  Discussed all medication indications, dosage and effectiveness, adverse effects, and adherence with patient/caregiver.    Pt had meds with them: yes - hydrochlorothiazide, famotidine, amlodipine, fluticasone nasal spray, naproxen, atorvastatin  Pt had med list with them: no  Pt was knowledgeable about meds: yes, somewhat  Medications set up by: self  Medications administered by someone else (e.g., LTCF): No  Pt uses a medication box or automated dispenser: did not ask  Called pharmacy to obtain or clarify med list:  no  Called [facility] or [facility] to obtain or clarify med list:  no    Medication Discrepancies  Medications on EMR med list that pt is NOT taking:  None typically, but did not take blood pressure meds this week  Medications pt IS taking that are NOT on EMR med list (e.g., from specialist, hospital): none  OTC meds/ dietary supplements pt taking on own that are NOT on EMR med list:  none  Dosage listed differently than how patient is taking: none  Frequency listed differently than how patient is taking: none  Duplicate medication on list (two occurrences of the same medication):  none  TOTAL NUMBER OF MEDICATION DISCREPANCIES:  0  ______________________________________________________________________      Subjective:  Wilfrid Gil is here today for one-week follow up with PharmD.     Patient established care at [facility] one week ago with Dr. Weiner. She was unable to  her prescriptions from the pharmacy until today because she didn't have enough money (almost all of the money she gets from the [facility] goes directly to pay rent). She could only afford some of the medications, so she opted to not  the oxybutynin and vitamin D today, but she will plan to get those next week.     Medication experience:   Patient states that taking medications is important to  "her. She is concerned about cost being an issue and wants to be able to afford the prescriptions every month. Patient also expresses that she wants each medication to \"work well\" for her.     Hypertension:  Patient has not been able to monitor her blood pressure at home this week, nor has she taken her blood prssure medications. She reports ongoing fatigue, dizziness, and blurry vision and she believes this is due to high blood pressure. She also states that she has had a headache since last Wednesday (which is typical for her). The headache \"comes and goes\" and acetaminophen has not helped much. She is concerned about her BP being too high but is also worried that the BP medications might make her BP too low (has passed out in the past due to low BP). Patient is willing to start taking BP medications starting today.     GERD:   Patient reports that her acid reflux has been worse without famotidine. She states that she often cannot eat anything at all because her chest hurts so badly when she eats. She denies having seen a specialist or a scope done. She believes she took Prilosec in the past and that was not very effective.     Bladder leakage:   Patient states that she has been experiencing even more symptoms    Patient is interested in getting a medical bracelet.    Patient reported being noncompliant some of the time - this week couldn't afford medications  Patient reports no side effects currently, but has been dizzy from antihypertensives in the past    Patient Active Problem List   Diagnosis     Osteoarthritis, unspecified osteoarthritis type, unspecified site     Irregular menstrual cycle     Moderate persistent asthma without complication     Continuous leakage of urine     Essential hypertension     Hyperlipidemia LDL goal <130     Sleep apnea, unspecified type     Gastroesophageal reflux disease without esophagitis     SOCIAL HISTORY:   reports that she has been smoking Cigarettes.  She has smoked for the " past 10.00 years. She does not have any smokeless tobacco history on file. She reports that she drinks alcohol. She reports that she does not use illicit drugs.    Current Outpatient Prescriptions   Medication Sig Dispense Refill     ACETAMINOPHEN PO Take 500 mg by mouth       albuterol (PROAIR HFA/PROVENTIL HFA/VENTOLIN HFA) 108 (90 BASE) MCG/ACT Inhaler Inhale 2 puffs into the lungs every 6 hours       ipratropium - albuterol 0.5 mg/2.5 mg/3 mL (DUONEB) 0.5-2.5 (3) MG/3ML neb solution Take 1 vial by nebulization every 6 hours as needed       mometasone-formoterol (DULERA) 200-5 MCG/ACT oral inhaler Inhale 2 puffs into the lungs 2 times daily       hydrochlorothiazide 12.5 MG TABS tablet Take 1 tablet (12.5 mg) by mouth daily 30 tablet 6     famotidine (PEPCID) 10 MG tablet Take 2 tablets (20 mg) by mouth daily 60 tablet 3     amLODIPine (NORVASC) 5 MG tablet Take 1 tablet (5 mg) by mouth daily 30 tablet 1     fluticasone (FLONASE) 50 MCG/ACT spray Spray 1-2 sprays into both nostrils daily 1 Bottle 11     oxybutynin (DITROPAN-XL) 5 MG 24 hr tablet Take 1 tablet (5 mg) by mouth daily 30 tablet 3     Vitamin D, Cholecalciferol, 1000 UNITS TABS Take 1,000 Units by mouth daily 30 tablet 11     naproxen sodium (ANAPROX) 220 MG tablet Take one tablet once daily, may increase to twice daily if needed for pain 60 tablet 3     Blood Pressure Monitoring (BLOOD PRESSURE KIT) KIT Use daily 1 kit 0     atorvastatin (LIPITOR) 40 MG tablet Take 1 tablet (40 mg) by mouth daily 30 tablet 11     Allergies   Allergen Reactions     Iodine Other (See Comments)     Burning sensation     Ciprofloxacin Rash       Environmental factors that may impact patient: some, including financial instability and dependence on county resources.      Objective:    BP (!) 170/111  Pulse 84  Temp 98.5  F (36.9  C) (Oral)  Resp 16  Wt 200 lb (90.7 kg)  SpO2 97%  BMI 31.32 kg/m2     BP Readings from Last 6 Encounters:   05/10/17 (!) 170/111    05/03/17 (!) 180/121     Estimated Creatinine Clearance: 112.3 mL/min (based on Cr of 0.7).  GFR Estimate   Date Value Ref Range Status   05/03/2017 >90 >60.0 mL/min/1.7 m2 Final     GFR Estimate If Black   Date Value Ref Range Status   05/03/2017 >90 >60.0 mL/min/1.7 m2 Final       BP (!) 170/111  Pulse 84  Temp 98.5  F (36.9  C) (Oral)  Resp 16  Wt 200 lb (90.7 kg)  SpO2 97%  BMI 31.32 kg/m2    Drug Therapy Assessment:  Drug therapy problems identified:     1. Essential hypertension       Status:  uncontrolled       DTP:  cannot afford drug product, DTP degree:2            Patient was unable to restart antihypertensive medications this week, so her BP has not improved. PharmD referred patient to  in clinic to discuss financial resources.     Patient will benefit from re-starting medications - she picked up medications today.    2. Gastroesophageal reflux disease without esophagitis       Status:  uncontrolled       DTP:  Same DTP as above    Patient's symptoms have not been controlled but she has not been taking the H2 blocker. Will restart medication today. Recommend patient potentially discuss endoscopy with Dr. Weiner at next visit.     3. Continuous leakage of urine       Status:  uncontrolled       DTP:  Same DTP as above    Patient's symptoms have worsened without oxybutynin, but she will restart oxybutynin next week when she can afford the prescription.     All medications were reviewed and found to be indicated, effective, safe and convenient unless drug therapy problem identified as described above.        Drug Therapy Plan and Follow up:    Plan    Patient met with  to discuss resources    Start taking all medications as prescribed today    Educated patient on the importance of taking her medications every day and that if she is unable to afford the copay, she should ask the pharmacy if they can waive the copay      Follow up: 1 week with PharmD  Patient was provided  with written instructions/medication list via AVS      Options for treatment and/or follow-up care were reviewed with the patient. Patient was engaged and actively involved in the decision making process.  Wilfrid Sorenson verbalized understanding of the options discussed and was satisfied with the final plan.      Dr. Weiner was provided my action  via routed note and Dr. Marrufo was available for supervision during this visit and is the authorizing prescriber for this visit through the pharmacist collaborative practice agreement.      Esther Machado, Pharm.D         Total Time: 30  # DTPs Identified: 1    Medical Condition 1: HTN, Goals of therapy: Not at goal, Drug Class: CCB,  ,  ,  , Convenience: Patient cannot afford, Intervention: Referral (), Verification: Patient Agreed - Compliance/Education

## 2017-05-10 NOTE — PATIENT INSTRUCTIONS
Start your blood pressure medicines today and we'll see you next week!    Follow up: PharmD appointment in one week. Dr. Weiner in two weeks.

## 2017-05-10 NOTE — Clinical Note
Dr. Weiner -- Wilfrid Gil returned to clinic for one week f/u today, but unfortunately did not start taking the medications that you and Nino prescribed last week. She couldn't afford the copays. She did  some of the prescriptions from the pharmacy earlier today but could not afford all of them. I had Yuridia meet with her to discuss possible resources, so they will be working together on that. I am going to see Wilfrid Gil again in one week for BP check after she starts taking the medications.   Thank you for allowing me to participate in the care of this patient. Please let me know if you have any other questions. Esther Machado, Pharm.D.

## 2017-05-10 NOTE — MR AVS SNAPSHOT
After Visit Summary   5/10/2017    Wilfrid Sorenson    MRN: [id number]           Patient Information     Date Of Birth          1970        Visit Information        Provider Department      5/10/2017 1:20 PM Esther Machado [facility]        Care Instructions    Start your blood pressure medicines today and we'll see you next week!    Follow up: PharmD appointment in one week. Dr. Weiner in two weeks.        Follow-ups after your visit        Your next 10 appointments already scheduled     2017 10:00 AM CDT   New Sleep Patient with Rema Bah MD   [facility] [place], Sleep Study ([facility])    [address] [place] [id number] [phone]              Who to contact     Please call your clinic at [phone] to:    Ask questions about your health    Make or cancel appointments    Discuss your medicines    Learn about your test results    Speak to your doctor   If you have compliments or concerns about an experience at your clinic, or if you wish to file a complaint, please contact [facility] Patient Relations at [phone] or email us at [email or web address]         Additional Information About Your Visit        MyChart Information     [employer] is an electronic gateway that provides easy, online access to your medical records. With [employer], you can request a clinic appointment, read your test results, renew a prescription or communicate with your care team.     To sign up for [employer] visit the website at [email or web address]   You will be asked to enter the access code listed below, as well as some personal information. Please follow the directions to create your username and password.     Your access code is: [email or web address]  Expires: 2017 12:15 PM     Your access code will  in 90 days. If you need help or a new code, please contact  your [facility] or call [phone] for assistance.        Care EveryWhere ID     This is your Care EveryWhere ID. This could be used by other organizations to access your [facility] medical records  [id number]        Your Vitals Were     Pulse Temperature Respirations Pulse Oximetry BMI (Body Mass Index)       84 98.5  F (36.9  C) (Oral) 16 97% 31.32 kg/m2        Blood Pressure from Last 3 Encounters:   05/10/17 (!) 170/111   05/03/17 (!) 180/121    Weight from Last 3 Encounters:   05/10/17 200 lb (90.7 kg)   05/03/17 207 lb 6.4 oz (94.1 kg)              Today, you had the following     No orders found for display       Primary Care Provider Office Phone # Fax #    Ashley Weiner [phone]       [facility] [address] [address]        Thank you!     Thank you for choosing [facility]  for your care. Our goal is always to provide you with excellent care. Hearing back from our patients is one way we can continue to improve our services. Please take a few minutes to complete the written survey that you may receive in the mail after your visit with us. Thank you!             Your Updated Medication List - Protect others around you: Learn how to safely use, store and throw away your medicines at www.disposemymeds.org.          This list is accurate as of: 5/10/17  2:19 PM.  Always use your most recent med list.                   Brand Name Dispense Instructions for use    ACETAMINOPHEN PO      Take 500 mg by mouth       albuterol 108 (90 BASE) MCG/ACT Inhaler    PROAIR HFA/PROVENTIL HFA/VENTOLIN HFA     Inhale 2 puffs into the lungs every 6 hours       amLODIPine 5 MG tablet    NORVASC    30 tablet    Take 1 tablet (5 mg) by mouth daily       atorvastatin 40 MG tablet    LIPITOR    30 tablet    Take 1 tablet (40 mg) by mouth daily       blood pressure kit Kit     1 kit    Use daily       famotidine 10 MG tablet     PEPCID    60 tablet    Take 2 tablets (20 mg) by mouth daily       fluticasone 50 MCG/ACT spray    FLONASE    1 Bottle    Spray 1-2 sprays into both nostrils daily       hydrochlorothiazide 12.5 MG Tabs tablet     30 tablet    Take 1 tablet (12.5 mg) by mouth daily       ipratropium - albuterol 0.5 mg/2.5 mg/3 mL 0.5-2.5 (3) MG/3ML neb solution    DUONEB     Take 1 vial by nebulization every 6 hours as needed       mometasone-formoterol 200-5 MCG/ACT oral inhaler    DULERA     Inhale 2 puffs into the lungs 2 times daily       naproxen sodium 220 MG tablet    ANAPROX    60 tablet    Take one tablet once daily, may increase to twice daily if needed for pain       oxybutynin 5 MG 24 hr tablet    DITROPAN-XL    30 tablet    Take 1 tablet (5 mg) by mouth daily       Vitamin D (Cholecalciferol) 1000 UNITS Tabs     30 tablet    Take 1,000 Units by mouth daily

## 2017-05-19 NOTE — PROGRESS NOTES
===================    Pharmacy Attestation Statement:    Patient s case reviewed. I agree with the written assessment and plan of care.    Nino Washington PharmD.    ===================

## 2017-06-01 ENCOUNTER — OFFICE VISIT (OUTPATIENT)
Dept: SLEEP MEDICINE | Facility: CLINIC | Age: 47
End: 2017-06-01
Attending: INTERNAL MEDICINE
Payer: COMMERCIAL

## 2017-06-01 VITALS
OXYGEN SATURATION: 98 % | WEIGHT: 205 LBS | RESPIRATION RATE: 18 BRPM | SYSTOLIC BLOOD PRESSURE: 155 MMHG | DIASTOLIC BLOOD PRESSURE: 71 MMHG | BODY MASS INDEX: 32.18 KG/M2 | HEART RATE: 98 BPM | HEIGHT: 67 IN

## 2017-06-01 DIAGNOSIS — R06.09 DOE (DYSPNEA ON EXERTION): ICD-10-CM

## 2017-06-01 DIAGNOSIS — G47.10 HYPERSOMNOLENCE: ICD-10-CM

## 2017-06-01 DIAGNOSIS — J44.9 CHRONIC OBSTRUCTIVE PULMONARY DISEASE, UNSPECIFIED COPD TYPE (H): ICD-10-CM

## 2017-06-01 DIAGNOSIS — G47.30 SLEEP APNEA, UNSPECIFIED TYPE: Primary | ICD-10-CM

## 2017-06-01 PROCEDURE — 99211 OFF/OP EST MAY X REQ PHY/QHP: CPT | Mod: ZF

## 2017-06-01 RX ORDER — ZOLPIDEM TARTRATE 5 MG/1
TABLET ORAL
Qty: 1 TABLET | Refills: 0 | Status: SHIPPED | OUTPATIENT
Start: 2017-06-01 | End: 2019-11-20

## 2017-06-01 NOTE — PATIENT INSTRUCTIONS
"    MY TREATMENT INFORMATION FOR SLEEP APNEA-  Ra Louise Sorenson    DOCTOR : Rema Bah      Am I having a sleep study at a sleep center?  Make sure you have an appointment for the study before you leave!    Am I having a home sleep study?  Watch this video:  [email or web address].com/watch?v=CteI_[email or web address]    Frequently asked questions:  1. What is Obstructive Sleep Apnea (MARYBEL)? MARYBEL is the most common type of sleep apnea. Apnea means, \"without breath.\"  Apnea is most often caused by narrowing or collapse of the upper airway as muscles relax during sleep.   Almost everyone has occasional apneas. Most people with sleep apnea have had brief interruptions at night frequently for many years.  The severity of sleep apnea is related to how frequent and severe the events are.   2. What are the consequences of MARYBEL? Symptoms include: feeling sleepy during the day, snoring loudly, gasping or stopping of breathing, trouble sleeping, and occasionally morning headaches or heartburn at night.  Sleepiness can be serious and even increase the risk of falling asleep while driving. Other health consequences may include development of high blood pressure and other cardiovascular disease in persons who are susceptible. Untreated MARYBEL  can contribute to heart disease, stroke and diabetes.   3. What are the treatment options? In most situations, sleep apnea is a lifelong disease that must be managed with daily therapy. Medications are not effective for sleep apnea and surgery is generally not considered until other therapies have been tried. Your treatment is your choice . Continuous Positive Airway (CPAP) works right away and is the therapy that is effective in nearly everyone. An oral device to hold your jaw forward is usually the next most reliable option. Other options include postioning devices (to keep you off your back), weight loss, and surgery including a tongue pacing device. There is " more detail about some of these options below.    Important tips for using CPAP and similar devices   Know your equipment:  CPAP is continuous positive airway pressure that prevents obstructive sleep apnea by keeping the throat from collapsing while you are sleeping. In most cases, the device is  smart  and can slowly self-adjusts if your throat collapses and keeps a record every day of how well you are treated-this information is available to you and your care team.  BPAP is bilevel positive airway pressure that keeps your throat open and also assists each breath with a pressure boost to maintain adequate breathing.  Special kinds of BPAP are used in patients who have inadequate breathing from lung or heart disease. In most cases, the device is  smart  and can slowly self-adjusts to assist breathing. Like CPAP, the device keeps a record of how well you are treated.  Your mask is your connection to the device. You get to choose what feels most comfortable and the staff will help to make sure if fits. Here: are some examples of the different masks that are available:       Key points to remember on your journey with sleep apnea:  1. Sleep study.  PAP devices often need to be adjusted during a sleep study to show that they are effective and adjusted right.  2. Good tips to remember: Try wearing just the mask during a quiet time during the day so your body adapts to wearing it. A humidifier is recommended for comfort in most cases to prevent drying of your nose and throat. Allergy medication from your provider may help you if you are having nasal congestion.  3. Getting settled-in. It takes more than one night for most of us to get used to wearing a mask. Try wearing just the mask during a quiet time during the day so your body adapts to wearing it. A humidifier is recommended for comfort in most cases. Our team will work with you carefully on the first day and will be in contact within 4 days and again at 2 and 4  weeks for advice and remote device adjustments. Your therapy is evaluated by the device each day.   4. Use it every night. The more you are able to sleep naturally for 7-8 hours, the more likely you will have good sleep and to prevent health risks or symptoms from sleep apnea. Even if you use it 4 hours it helps. Occasionally all of us are unable to use a medical therapy, in sleep apnea, it is not dangerous to miss one night.   5. Communicate. Call our skilled team on the number provided on the first day if your visit for problems that make it difficult to wear the device. Over 2 out of 3 patients can learn to wear the device long-term with help from our team. Remember to call our team or your sleep providers if you are unable to wear the device as we may have other solutions for those who cannot adapt to mask CPAP therapy. It is recommended that you sleep your sleep provider within the first 3 months and yearly after that if you are not having problems.   Take care of your equipment. Make sure you clean your mask and tubing using directions every day and that your filter and mask are replaced as recommended or if they are not working.     BESIDES CPAP, WHAT OTHER THERAPIES ARE THERE?    Positioning Device  Positioning devices are generally used when sleep apnea is mild and only occurs on your back.This example shows a pillow that straps around the waist. It may be appropriate for those whose sleep study shows milder sleep apnea that occurs primarily when lying flat on one's back. Preliminary studies have shown benefit but effectiveness at home may need to be verified by a home sleep test. These devices are generally not covered by medical insurance.    Oral Appliance  What is oral appliance therapy?  An oral appliance device fits on your teeth at night like a retainer used after having braces. The device is made by a specialized dentist and requires several visits over 1-2 months before a manufactured device is  made to fit your teeth and is adjusted to prevent your sleep apnea. Once an oral device is working properly, snoring should be improved. A home sleep test may be recommended at that time if to determine whether the sleep apnea is adequately treated.       Some things to remember:  -Oral devices are often, but not always, covered by your medical insurance. Be sure to check with your insurance provider.   -If you are referred for oral therapy, you will be given a list of specialized dentists to consider or you may choose to visit the Web site of the American Academy of Dental Sleep Medicine  -Oral devices are less likely to work if you have severe sleep apnea or are extremely overweight.     More detailed information  An oral appliance is a small acrylic device that fits over the upper and lower teeth  (similar to a retainer or a mouth guard). This device slightly moves jaw forward, which moves the base of the tongue forward, opens the airway, improves breathing for effective treat snoring and obstructive sleep apnea in perhaps 7 out of 10 people .  The best working devices are custom-made by a dental device  after a mold is made of the teeth 1, 2, 3.  When is an oral appliance indicated?  Oral appliance therapy is recommended as a first-line treatment for patients with primary snoring, mild sleep apnea, and for patients with moderate sleep apnea who prefer appliance therapy to use of CPAP4, 5. Severity of sleep apnea is determined by sleep testing and is based on the number of respiratory events per hour of sleep.   How successful is oral appliance therapy?  The success rate of oral appliance therapy in patients with mild sleep apnea is 75-80% while in patients with moderate sleep apnea it is 50-70%. The chance of success in patients with severe sleep apnea is 40-50%. The research also shows that oral appliances have a beneficial effect on the cardiovascular health of MARYBEL patients at the same magnitude as  CPAP therapy7.  Oral appliances should be a second-line treatment in cases of severe sleep apnea, but if not completely successful then a combination therapy utilizing CPAP plus oral appliance therapy may be effective. Oral appliances tend to be effective in a broad range of patients although studies show that the patients who have the highest success are females, younger patients, those with milder disease, and less severe obesity. 3, 6.   Finding a dentist that practices dental sleep medicine  Specific training is available through the American Academy of Dental Sleep Medicine for dentists interested in working in the field of sleep. To find a dentist who is educated in the field of sleep and the use of oral appliances, near you, visit the Web site of the American Academy of Dental Sleep Medicine.    References  1. Stacy et al. Objectively measured vs self-reported compliance during oral appliance therapy for sleep-disordered breathing. Chest 2013; 144(5): 1621-0608.  2. Abiodun et al. Objective measurement of compliance during oral appliance therapy for sleep-disordered breathing. Thorax 2013; 68(1): 91-96.  3. Marichuy et al. Mandibular advancement devices in 620 men and women with MARYBEL and snoring: tolerability and predictors of treatment success. Chest 2004; 125: 3896-7284.  4. Roosevelt, et al. Oral appliances for snoring and MARYBEL: a review. Sleep 2006; 29: 244-262.  5. Madison et al. Oral appliance treatment for MARYBEL: an update. J Clin Sleep Med 2014; 10(2): 215-227.  6. Franklyn et al. Predictors of OSAH treatment outcome. J Dent Res 2007; 86: 0937-8199.      Weight Loss:    Weight loss is a long-term strategy that may improve sleep apnea in some patients.    Weight management is a personal decision.  If you are interested in exploring weight loss strategies, the following discussion covers the impact on weight loss on sleep apnea and the approaches that may be successful.    Weight loss  decreases severity of sleep apnea in most people with obesity. For those with mild obesity who have developed snoring with weight gain, even 15-30 pound weight loss can improve and occasionally eliminate sleep apnea.  Structured and life-long dietary and health habits are necessary to lose weight and keep healthier weight levels.     Though there may be significant health benefits from weight loss, long-term weight loss is very difficult to achieve- studies show success with dietary management in less than 10% of people. In addition, substantial weight loss may require years of dietary control and may be difficult if patients have severe obesity. In these cases, surgical management may be considered.  Finally, older individuals who have tolerated obesity without health complications may be less likely to benefit from weight loss strategies.      More detailed information    Your BMI is Body mass index is 32.11 kg/(m^2).  Body mass index (BMI) is one way to tell whether you are at a healthy weight, overweight, or obese. It measures your weight in relation to your height.  A BMI of 18.5 to 24.9 is in the healthy range. A person with a BMI of 25 to 29.9 is considered overweight, and someone with a BMI of 30 or greater is considered obese. More than two-thirds of American adults are considered overweight or obese.  Being overweight or obese increases the risk for further weight gain. Excess weight may lead to heart disease and diabetes.  Creating and following plans for healthy eating and physical activity may help you improve your health.  Weight control is part of healthy lifestyle and includes exercise, emotional health, and healthy eating habits. Careful eating habits lifelong are the mainstay of weight control. Though there are significant health benefits from weight loss, long-term weight loss with diet alone may be very difficult to achieve- studies show long-term success with dietary management in less than 10%  of people. Attaining a healthy weight may be especially difficult to achieve in those with severe obesity. In some cases, medications, devices and surgical management might be considered.  What can you do?  If you are overweight or obese and are interested in methods for weight loss, you should discuss this with your provider.     Consider reducing daily calorie intake by 500 calories.     Keep a food journal.     Avoiding skipping meals, consider cutting portions instead.    Diet combined with exercise helps maintain muscle while optimizing fat loss. Strength training is particularly important for building and maintaining muscle mass. Exercise helps reduce stress, increase energy, and improves fitness. Increasing exercise without diet control, however, may not burn enough calories to loose weight.       Start walking three days a week 10-20 minutes at a time    Work towards walking thirty minutes five days a week     Eventually, increase the speed of your walking for 1-2 minutes at time    In addition, we recommend that you review healthy lifestyles and methods for weight loss available through the National Institutes of Health patient information sites:  http://win.niddk.nih.gov/publications/index.htm    And look into health and wellness programs that may be available through your health insurance provider, employer, local community center, or [employer].    Weight management plan: Patient was referred to their primary physician to discuss a diet and exercise plan.    Surgery:    Surgery for obstructive sleep apnea is considered generally only when other therapies fail to work. Surgery may be discussed with you if you are having a difficult time tolerating CPAP and or when there is an abnormal structure that requires surgical correction.  Nose and throat surgeries often enlarge the airway to prevent collapse.  Most of these surgeries create pain for 1-2 weeks and up to half of the most common surgeries are not  effective throughout life.  You should carefully discuss the benefits and drawbacks to surgery with your sleep provider and surgeon to determine if it is the best solution for you.   More information  Surgery for MARYBEL is directed at areas that are responsible for narrowing or complete obstruction of the airway during sleep.  There are a wide range of procedures available to enlarge and/or stabilize the airway to prevent blockage of breathing in the three major areas where it can occur: the palate, tongue, and nasal regions.  Successful surgical treatment depends on the accurate identification of the factors responsible for obstructive sleep apnea in each person.  A personalized approach is required because there is no single treatment that works well for everyone.  Because of anatomic variation, consultation with an examination by a sleep surgeon is a critical first step in determining what surgical options are best for each patient.  In some cases, examination during sedation may be recommended in order to guide the selection of procedures.  Patients will be counseled about risks and benefits as well as the typical recovery course after surgery. Surgery is typically not a cure for a person s MARYBEL.  However, surgery will often significantly improve one s MARYBEL severity (termed  success rate ).  Even in the absence of a cure, surgery will decrease the cardiovascular risk associated with OSA7; improve overall quality of life8 (sleepiness, functionality, sleep quality, etc).      Palate Procedures:  Patients with MARYBEL often have narrowing of their airway in the region of their tonsils and uvula.  The goals of palate procedures are to widen the airway in this region as well as to help the tissues resist collapse.  Modern palate procedure techniques focus on tissue conservation and soft tissue rearrangement, rather than tissue removal.  Often the uvula is preserved in this procedure. Residual sleep apnea is common in patient  after pharyngoplasty with an average reduction in sleep apnea events of 33%2.      Tongue Procedures:  ExamWhile patients are awake, the muscles that surround the throat are active and keep this region open for breathing. These muscles relax during sleep, allowing the tongue and other structures to collapse and block breathing.  There are several different tongue procedures available.  Selection of a tongue base procedure depends on characteristics seen on physical exam.  Generally, procedures are aimed at removing bulky tissues in this area or preventing the back of the tongue from falling back during sleep.  Success rates for tongue surgery range from 50-62%3.    Hypoglossal Nerve Stimulation:  Hypoglossal nerve stimulation has recently received approval from the [place] Food and Drug Administration for the treatment of obstructive sleep apnea.  This is based on research showing that the system was safe and effective in treating sleep apnea6.  Results showed that the median AHI score decreased 68%, from 29.3 to 9.0. This therapy uses an implant system that senses breathing patterns and delivers mild stimulation to airway muscles, which keeps the airway open during sleep.  The system consists of three fully implanted components: a small generator (similar in size to a pacemaker), a breathing sensor, and a stimulation lead.  Using a small handheld remote, a patient turns the therapy on before bed and off upon awakening.    Candidates for this device must be greater than 22 years of age, have moderate to severe MARYBEL (AHI between 20-65), BMI less than 32, have tried CPAP/oral appliance without success, and have appropriate upper airway anatomy (determined by a sleep endoscopy performed by Dr. Prater).    Hypoglossal Nerve Stimulation Pathway:    The sleep surgeon s office will work with the patient through the insurance prior-authorization process (including communications and appeals).    Nasal Procedures:  Nasal  obstruction can interfere with nasal breathing during the day and night.  Studies have shown that relief of nasal obstruction can improve the ability of some patients to tolerate positive airway pressure therapy for obstructive sleep apnea1.  Treatment options include medications such as nasal saline, topical corticosteroid and antihistamine sprays, and oral medications such as antihistamines or decongestants. Non-surgical treatments can include external nasal dilators for selected patients. If these are not successful by themselves, surgery can improve the nasal airway either alone or in combination with these other options.      Combination Procedures:  Combination of surgical procedures and other treatments may be recommended, particularly if patients have more than one area of narrowing or persistent positional disease.  The success rate of combination surgery ranges from 66-80%2,3.    References  1. Anthony AVILES. The Role of the Nose in Snoring and Obstructive Sleep Apnoea: An Update.  Eur Arch Otorhinolaryngol. 2011; 268: 1365-73.  2.  Wenceslao SM; Ailyn JA; Pretty JR; Pallanch JF; María Elena MB; Allyson SG; Noah SHARMA. Surgical modifications of the upper airway for obstructive sleep apnea in adults: a systematic review and meta-analysis. SLEEP 2010;33(10):1988-0462. Sheri PERSAUD. Hypopharyngeal surgery in obstructive sleep apnea: an evidence-based medicine review.  Arch Otolaryngol Head Neck Surg. 2006 Feb;132(2):206-13.  3. Petey LARESH1, David Y, Bg ALICE. The efficacy of anatomically based multilevel surgery for obstructive sleep apnea. Otolaryngol Head Neck Surg. 2003 Oct;129(4):327-35.  4. Sheri PERSAUD, Goldberg A. Hypopharyngeal Surgery in Obstructive Sleep Apnea: An Evidence-Based Medicine Review. Arch Otolaryngol Head Neck Surg. 2006 Feb;132(2):206-13.  5. Aquiles BUSBY et al. Upper-Airway Stimulation for Obstructive Sleep Apnea.  N Engl J Med. 2014 Jan 9;370(2):139-49.  6. Bret LARES et al. Increased Incidence of  Cardiovascular Disease in Middle-aged Men with Obstructive Sleep Apnea. Am J Respir Crit Care Med; 2002 166: 159-165  7. Alma ORTIZ et al. Studying Life Effects and Effectiveness of Palatopharyngoplasty (SLEEP) study: Subjective Outcomes of Isolated Uvulopalatopharyngoplasty. Otolaryngol Head Neck Surg. 2011; 144: 623-631.              Your BMI is Body mass index is 32.11 kg/(m^2).  Weight management is a personal decision.  If you are interested in exploring weight loss strategies, the following discussion covers the approaches that may be successful. Body mass index (BMI) is one way to tell whether you are at a healthy weight, overweight, or obese. It measures your weight in relation to your height.  A BMI of 18.5 to 24.9 is in the healthy range. A person with a BMI of 25 to 29.9 is considered overweight, and someone with a BMI of 30 or greater is considered obese. More than two-thirds of American adults are considered overweight or obese.  Being overweight or obese increases the risk for further weight gain. Excess weight may lead to heart disease and diabetes.  Creating and following plans for healthy eating and physical activity may help you improve your health.  Weight control is part of healthy lifestyle and includes exercise, emotional health, and healthy eating habits. Careful eating habits lifelong are the mainstay of weight control. Though there are significant health benefits from weight loss, long-term weight loss with diet alone may be very difficult to achieve- studies show long-term success with dietary management in less than 10% of people. Attaining a healthy weight may be especially difficult to achieve in those with severe obesity. In some cases, medications, devices and surgical management might be considered.  What can you do?  If you are overweight or obese and are interested in methods for weight loss, you should discuss this with your provider.     Consider reducing daily calorie intake by  500 calories.     Keep a food journal.     Avoiding skipping meals, consider cutting portions instead.    Diet combined with exercise helps maintain muscle while optimizing fat loss. Strength training is particularly important for building and maintaining muscle mass. Exercise helps reduce stress, increase energy, and improves fitness. Increasing exercise without diet control, however, may not burn enough calories to loose weight.       Start walking three days a week 10-20 minutes at a time    Work towards walking thirty minutes five days a week     Eventually, increase the speed of your walking for 1-2 minutes at time    In addition, we recommend that you review healthy lifestyles and methods for weight loss available through the National Institutes of Health patient information sites:  http://win.niddk.nih.gov/publications/index.htm    And look into health and wellness programs that may be available through your health insurance provider, employer, local community center, or [employer].    Weight management plan: Patient was referred to their PCP to discuss a diet and exercise plan.     Your blood pressure was checked while you were in clinic today.  Please read the guidelines below about what these numbers mean and what you should do about them.  Your systolic blood pressure is the top number.  This is the pressure when the heart is pumping.  Your diastolic blood pressure is the bottom number.  This is the pressure in between beats.  If your systolic blood pressure is less than 120 and your diastolic blood pressure is less than 80, then your blood pressure is normal. There is nothing more that you need to do about it  If your systolic blood pressure is 120-139 or your diastolic blood pressure is 80-89, your blood pressure may be higher than it should be.  You should have your blood pressure re-checked within a year by a primary care provider.  If your systolic blood pressure is 140 or greater or your diastolic  blood pressure is 90 or greater, you may have high blood pressure.  High blood pressure is treatable, but if left untreated over time it can put you at risk for heart attack, stroke, or kidney failure.  You should have your blood pressure re-checked by a primary care provider within the next four weeks.

## 2017-06-01 NOTE — MR AVS SNAPSHOT
After Visit Summary   6/1/2017    Wilfrid Sorenson    MRN: [id number]           Patient Information     Date Of Birth          1970        Visit Information        Provider Department      6/1/2017 10:00 AM Rema Bah MD [facility] [place], Sleep Study        Today's Diagnoses     Sleep apnea, unspecified type    -  1    Hypersomnolence        NICKERSON (dyspnea on exertion)        Chronic obstructive pulmonary disease, unspecified COPD type (H)          Care Instructions      Your BMI is Body mass index is 32.11 kg/(m^2).  Weight management is a personal decision.  If you are interested in exploring weight loss strategies, the following discussion covers the approaches that may be successful. Body mass index (BMI) is one way to tell whether you are at a healthy weight, overweight, or obese. It measures your weight in relation to your height.  A BMI of 18.5 to 24.9 is in the healthy range. A person with a BMI of 25 to 29.9 is considered overweight, and someone with a BMI of 30 or greater is considered obese. More than two-thirds of American adults are considered overweight or obese.  Being overweight or obese increases the risk for further weight gain. Excess weight may lead to heart disease and diabetes.  Creating and following plans for healthy eating and physical activity may help you improve your health.  Weight control is part of healthy lifestyle and includes exercise, emotional health, and healthy eating habits. Careful eating habits lifelong are the mainstay of weight control. Though there are significant health benefits from weight loss, long-term weight loss with diet alone may be very difficult to achieve- studies show long-term success with dietary management in less than 10% of people. Attaining a healthy weight may be especially difficult to achieve in those with severe obesity. In some cases, medications, devices and surgical management might be considered.  What can you  do?  If you are overweight or obese and are interested in methods for weight loss, you should discuss this with your provider.     Consider reducing daily calorie intake by 500 calories.     Keep a food journal.     Avoiding skipping meals, consider cutting portions instead.    Diet combined with exercise helps maintain muscle while optimizing fat loss. Strength training is particularly important for building and maintaining muscle mass. Exercise helps reduce stress, increase energy, and improves fitness. Increasing exercise without diet control, however, may not burn enough calories to loose weight.       Start walking three days a week 10-20 minutes at a time    Work towards walking thirty minutes five days a week     Eventually, increase the speed of your walking for 1-2 minutes at time    In addition, we recommend that you review healthy lifestyles and methods for weight loss available through the National Institutes of Health patient information sites:  http://win.niddk.nih.gov/publications/index.htm    And look into health and wellness programs that may be available through your health insurance provider, employer, local community center, or [employer].    Weight management plan: Patient was referred to their PCP to discuss a diet and exercise plan.     Your blood pressure was checked while you were in clinic today.  Please read the guidelines below about what these numbers mean and what you should do about them.  Your systolic blood pressure is the top number.  This is the pressure when the heart is pumping.  Your diastolic blood pressure is the bottom number.  This is the pressure in between beats.  If your systolic blood pressure is less than 120 and your diastolic blood pressure is less than 80, then your blood pressure is normal. There is nothing more that you need to do about it  If your systolic blood pressure is 120-139 or your diastolic blood pressure is 80-89, your blood pressure may be higher than  "it should be.  You should have your blood pressure re-checked within a year by a primary care provider.  If your systolic blood pressure is 140 or greater or your diastolic blood pressure is 90 or greater, you may have high blood pressure.  High blood pressure is treatable, but if left untreated over time it can put you at risk for heart attack, stroke, or kidney failure.  You should have your blood pressure re-checked by a primary care provider within the next four weeks.                Follow-ups after your visit        Who to contact     If you have questions or need follow up information about today's clinic visit or your schedule please contact [facility] directly at [phone].  Normal or non-critical lab and imaging results will be communicated to you by Pin or Peghart, letter or phone within 4 business days after the clinic has received the results. If you do not hear from us within 7 days, please contact the clinic through Pin or Peghart or phone. If you have a critical or abnormal lab result, we will notify you by phone as soon as possible.  Submit refill requests through PANOSOL or call your pharmacy and they will forward the refill request to us. Please allow 3 business days for your refill to be completed.          Additional Information About Your Visit        Pin or Peghart Information     PANOSOL lets you send messages to your doctor, view your test results, renew your prescriptions, schedule appointments and more. To sign up, go to [email or web address] . Click on \"Log in\" on the left side of the screen, which will take you to the Welcome page. Then click on \"Sign up Now\" on the right side of the page.     You will be asked to enter the access code listed below, as well as some personal information. Please follow the directions to create your username and password.     Your access code is: [email or web address]  Expires: 2017 12:15 PM     Your access code will  in 90 days. If you need help or a new " "code, please call your [facility] clinic or [phone].        Care EveryWhere ID     This is your Care EveryWhere ID. This could be used by other organizations to access your [facility] medical records  [id number]        Your Vitals Were     Pulse Respirations Height Pulse Oximetry BMI (Body Mass Index)       98 18 1.702 m (5' 7\") 98% 32.11 kg/m2        Blood Pressure from Last 3 Encounters:   06/01/17 155/71   05/10/17 (!) 170/111   05/03/17 (!) 180/121    Weight from Last 3 Encounters:   06/01/17 93 kg (205 lb)   05/10/17 90.7 kg (200 lb)   05/03/17 94.1 kg (207 lb 6.4 oz)              We Performed the Following     SLEEP EVALUATION & MANAGEMENT REFERRAL - ADULT        Primary Care Provider Office Phone # Fax #    Ashley Weiner [phone]       [facility] [address] [address]        Thank you!     Thank you for choosing [facility]  for your care. Our goal is always to provide you with excellent care. Hearing back from our patients is one way we can continue to improve our services. Please take a few minutes to complete the written survey that you may receive in the mail after your visit with us. Thank you!             Your Updated Medication List - Protect others around you: Learn how to safely use, store and throw away your medicines at www.disposemymeds.org.          This list is accurate as of: 6/1/17 10:44 AM.  Always use your most recent med list.                   Brand Name Dispense Instructions for use    ACETAMINOPHEN PO      Take 500 mg by mouth       albuterol 108 (90 BASE) MCG/ACT Inhaler    PROAIR HFA/PROVENTIL HFA/VENTOLIN HFA     Inhale 2 puffs into the lungs every 6 hours       amLODIPine 5 MG tablet    NORVASC    30 tablet    Take 1 tablet (5 mg) by mouth daily       atorvastatin 40 MG tablet    LIPITOR    30 tablet    Take 1 tablet (40 mg) by mouth daily       famotidine 10 MG tablet    PEPCID    60 tablet    Take 2 tablets " (20 mg) by mouth daily       fluticasone 50 MCG/ACT spray    FLONASE    1 Bottle    Spray 1-2 sprays into both nostrils daily       hydrochlorothiazide 12.5 MG Tabs tablet     30 tablet    Take 1 tablet (12.5 mg) by mouth daily       ipratropium - albuterol 0.5 mg/2.5 mg/3 mL 0.5-2.5 (3) MG/3ML neb solution    DUONEB     Take 1 vial by nebulization every 6 hours as needed       mometasone-formoterol 200-5 MCG/ACT oral inhaler    DULERA     Inhale 2 puffs into the lungs 2 times daily       naproxen sodium 220 MG tablet    ANAPROX    60 tablet    Take one tablet once daily, may increase to twice daily if needed for pain       oxybutynin 5 MG 24 hr tablet    DITROPAN-XL    30 tablet    Take 1 tablet (5 mg) by mouth daily       Vitamin D (Cholecalciferol) 1000 UNITS Tabs     30 tablet    Take 1,000 Units by mouth daily

## 2017-06-01 NOTE — PROGRESS NOTES
"DATE OF SERVICE:  06/01/2017       CHIEF COMPLAINT:  Consultation requested by Ashley Weiner MD for the evaluation of sleepiness and obstructive sleep apnea.      HISTORY OF PRESENT ILLNESS:  Ms. Sorenson is a 47-year-old female with a past medical history significant for severe hypertension, asthma, COPD and arthritis.  She also was diagnosed with obstructive sleep apnea she believes in 2012, was on CPAP for a few months.  She had difficulty tolerating the \"helmet\".  She also had her tonsils and adenoids removed after the diagnosis.  She lost her CPAP as part of a move and she has not been on CPAP for years.  She is interested in reconsidering CPAP due to deterioration of her health since that time, particularly related to her risk for heart and cerebrovascular disease.  She mentions today that she has had problems with chest pain and in fact presented to the emergency room, she believes at [facility] and was recommended to get a stress test.  I urged her to discuss that again with her primary care team. ( I tried to reach results/notes through Care Everywhere, but I was not able to find her Care Everywhere ID.)      The patient has difficulty maintaining sleep.  She may sometimes try to fall asleep around 8:00 to 9:00 p.m. She will often have the TV on but she gets woken up every night around 2:00, often because from the apartment upstairs.  She may eat something.  She also struggles with a lot of pain and the need to get up to go to the bathroom.  She has pain in her back from the neck down into her hips.  She sometimes even wears diapers because of her urinary frequency.  Once she wakes up at 2:00, she has difficulty falling back asleep.  Sometimes she will make a cup of coffee and stay up until she needs to get her son off to school.  When she returns, she may be up for a little bit longer and then she will sleep from noon until 4:00, so she feels she has been sleeping better during the day.  She has been " told by family that she gasps and coughs in her sleep, snores and has witnessed apneas.  She has also had some sleep walking, but she has partial memory for this, usually triggered by sound which she thinks is a doorbell, she will go to the door.  Freedom is 13.  She is drinking 2-4 caffeinated beverages a day.  She has tried some sleep aids; however, she finds a couple of ounces of alcohol more helpful.  She does not drink alcohol nightly however.  She denies classic restless legs symptoms but does have a lot of pain affecting her sleep as mentioned.  No history of dream enactment behavior.  She has gained weight since her previous diagnosis of sleep apnea.      PAST MEDICAL HISTORY:   1.  Asthma.     2.  COPD.   3.  Hyperlipidemia.   4.  Eczema.   5.  Hypertension.   6.  Urinary frequency.   7.  Hidradenitis suppurativa status post surgery with skin grafting.   8.  Multiple C-sections.   9.  Tonsils and adenoid removal.   10.  Cholecystectomy.      ALLERGIES:  Include iodine and ciprofloxacin.      MEDICATIONS:   1.  Albuterol as needed.   2.  DuoNeb as needed.     3.  Mometasone-formoterol 200/5 two puffs twice daily.   4.  Hydrochlorothiazide 12.5 mg daily.     5.  Famotidine 20 mg daily.     6.  Amlodipine 5 mg daily.   7.  Nasal fluticasone 1-2 sprays both nostrils daily.   8.  Oxybutynin 5 mg daily.   9.  Vitamin D daily.   10.  Naprosyn as needed.   11.  Atorvastatin 40 mg daily.      SOCIAL HISTORY:  See HPI.  She is not working.  She uses [employer].  Her phone is going to be turned off in the next week or two so communication should be by letter.  She has formally worked doing 24-hour .  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  Currently she lives with 2 of her sons, one 13, one 20.  There is no smoking in the home.  No history of substance use.      FAMILY HISTORY:  Father is alive, he is currently in the hospital getting a pacemaker, has history of multiple  brain aneurysms and stroke.  The patient was told that she should get screened for aneurysms but has not yet done this.  Mother's medical problems include peripheral vascular disease, diabetes, hypertension and asthma.      REVIEW OF SYSTEMS:  Comprehensive review of systems is obtained and is positive for night sweats which she associates with menopause, frequent episodes of chest pain triggered by being upset, bending, reaching and physical activity.  She also has frequent migraines and occasionally wakes up with headaches.  She has areas of numbness on her arms associated with her surgery and shortness of breath with exertion, cough, mucus associated with her asthma.  She also has intermittent nausea and reflux symptoms, urinary frequency per HPI and chronic pain, anxiety.  Otherwise remainder of the comprehensive review of systems is negative.      PHYSICAL EXAMINATION:   GENERAL:  Pleasant female in no distress.   VITAL SIGNS:  Blood pressure 155/71, pulse of 98, respirations 18.  Neck circumference is 38 cm.  Weight is 205 pounds, for a body mass index of 32, oxygen saturation is 98.   HEENT:  Normocephalic, atraumatic.  Pupils are equal, reactive to light.  Sclerae are without icterus.  Mallampati II.   NECK:  Supple, without lymphadenopathy.  She was wearing a wig.   CHEST:  With decreased breath sounds bilaterally but no rales or wheezes identified.   CARDIAC:  Somewhat distant S1, S2.   ABDOMEN:  Obese, nontender.  Decreased bowel sounds present.   EXTREMITIES:  Perfused.  There is trace bilateral lower extremity edema.  Recent labs show a bicarbonate on her metabolic panel of 26.  TSH of 1.1.      ASSESSMENT AND PLAN:  A 47-year-old female with a history of sleep apnea, reportedly moderate to severe.  Currently not on CPAP.  Major comorbidities including chronic obstructive pulmonary disease, asthma, and poorly controlled hypertension, symptomatic with sleep disruption and hypersomnia, coughing, gasping,  choking, also at increased risk for cardiovascular disease and may be having symptoms.      PLAN:   1.  Further assess respiratory status with formal pulmonary function testing.   2.  Proceed with overnight polysomnography in the sleep lab with the use of a sleep aid.  This was reviewed with her in detail.  It will be done in split night protocol.  A mask was shown to her today as she has concerns about tolerating the mask.  She did think that she would be able to tolerate the mask that was shown to her.  The patient will be contacted if the results show severe sleep apnea to initiate treatment.  Otherwise, she will follow up with me 2 weeks later.  Again contacting this patient may be a challenge and should be done on paper unless she is able to reinstate her phone.  She is agreeable with this plan.  Please see the instructions for further counseling provided today.  Again, she was urged to communicate recommendations from [facility] to her primary care physician as well for further cardiac workup.         LUCÍA VERA MD             D: 2017 11:18   T: 2017 12:36   MT: baylee      Name:     JOSE DELMER   MRN:      [id number]        Account:      [id number]   :      1970           Visit Date:   2017      Document: [id number]

## 2017-06-01 NOTE — NURSING NOTE
"Chief Complaint   Patient presents with     Consult     Discuss sleep apnea.  Previous study in [place]       Initial /71  Pulse 98  Resp 18  Ht 1.702 m (5' 7\")  Wt 93 kg (205 lb)  SpO2 98%  BMI 32.11 kg/m2 Estimated body mass index is 32.11 kg/(m^2) as calculated from the following:    Height as of this encounter: 1.702 m (5' 7\").    Weight as of this encounter: 93 kg (205 lb).  Medication Reconciliation: complete     MILE Mcghee        "

## 2017-06-02 DIAGNOSIS — J44.9 COPD (CHRONIC OBSTRUCTIVE PULMONARY DISEASE) (H): Primary | ICD-10-CM

## 2017-06-02 LAB — PULMONARY FUNCTION TEST-FENO: 5 PPB (ref 0–40)

## 2017-06-07 ENCOUNTER — TELEPHONE (OUTPATIENT)
Dept: SLEEP MEDICINE | Facility: CLINIC | Age: 47
End: 2017-06-07

## 2017-06-07 NOTE — TELEPHONE ENCOUNTER
Incoming records rev'd from [facility], but were only OB/GYN records.  [facility] hims department called and they stated they have no record of her having sleep study or consult there. OB/GYN records have been shredded.    MILE Mcghee

## 2017-06-28 LAB
DLCOUNC-%PRED-PRE: 66 %
DLCOUNC-PRE: 14.82 ML/MIN/MMHG
DLCOUNC-PRED: 22.43 ML/MIN/MMHG
ERV-%PRED-PRE: 76 %
ERV-PRE: 0.39 L
ERV-PRED: 0.51 L
EXPTIME-PRE: 4.63 SEC
FEF2575-%PRED-PRE: 142 %
FEF2575-PRE: 3.67 L/SEC
FEF2575-PRED: 2.57 L/SEC
FEFMAX-%PRED-PRE: 105 %
FEFMAX-PRE: 7 L/SEC
FEFMAX-PRED: 6.61 L/SEC
FEV1-%PRED-PRE: 104 %
FEV1-PRE: 2.63 L
FEV1FEV6-PRE: 87 %
FEV1FEV6-PRED: 82 %
FEV1FVC-PRE: 88 %
FEV1FVC-PRED: 82 %
FEV1SVC-PRE: 81 %
FEV1SVC-PRED: 74 %
FIFMAX-PRE: 2.75 L/SEC
FRCPLETH-%PRED-PRE: 73 %
FRCPLETH-PRE: 1.94 L
FRCPLETH-PRED: 2.63 L
FVC-%PRED-PRE: 97 %
FVC-PRE: 3 L
FVC-PRED: 3.09 L
IC-%PRED-PRE: 99 %
IC-PRE: 2.85 L
IC-PRED: 2.86 L
MEP-PRE: 70 CMH2O
MIP-PRE: -60 CMH2O
RVPLETH-%PRED-PRE: 92 %
RVPLETH-PRE: 1.56 L
RVPLETH-PRED: 1.68 L
TLCPLETH-%PRED-PRE: 100 %
TLCPLETH-PRE: 4.8 L
TLCPLETH-PRED: 4.77 L
VA-%PRED-PRE: 89 %
VA-PRE: 4.39 L
VC-%PRED-PRE: 96 %
VC-PRE: 3.24 L
VC-PRED: 3.37 L

## 2017-07-12 ENCOUNTER — TELEPHONE (OUTPATIENT)
Dept: FAMILY MEDICINE | Facility: CLINIC | Age: 47
End: 2017-07-12

## 2017-07-12 NOTE — TELEPHONE ENCOUNTER
[facility] phone call message- patient requesting form status:    Type of Form: Disability    Given to: Provider    Submitted: mail and fax     Date Submitted: 5/25 mail, 6/23 fax    Date Needed by: ASAP , or a call that provider will not complete forms    Additional Comments: please call Mario Alberto at above number regarding disability form. She would like a call even, if form will not be completed, so she knows.    OK to leave a message on voice mail? Yes    Primary language: English      needed? No    Call taken on July 12, 2017 at 2:22 PM by Valerie Garcia

## 2017-08-10 NOTE — TELEPHONE ENCOUNTER
Disability Form received.  I have only met Ms. Sorenson once, and we have not had clear follow up with her, no have I received many records regarding her previous health conditions or health care.  At that appointment we worked on her HTN, sleep apnea, menopausal symptoms and mood.  She also noted some arthritis and back pain, but we did not go into a detailed evaluation of that.  I can't provide a letter addressing her functional productivity at this time as it relates to her pain.  I would think she would need a work hardening evaluation first.    She has since been seen by the sleep clinic, but did not follow up with a sleep study as of yet.  I don't see any upcoming appointments in our system.    I called an left a message with Mario Alberto - stating I don't feel comfortable writing a letter at this time.  I'd recommend that she have an independent medical assessment done.  At the very least we need her back to our clinic to start figuring out next steps.    Ashley Weiner MD

## 2017-08-11 NOTE — TELEPHONE ENCOUNTER
"8/11/17 Mario Alberto from [employer] sent a fax asking that \"request be cancelled\". \"They are not in need of the documentation at this time\". Will forward to  as FYI.  "

## 2018-01-21 ENCOUNTER — HEALTH MAINTENANCE LETTER (OUTPATIENT)
Age: 48
End: 2018-01-21

## 2019-11-20 ENCOUNTER — PATIENT OUTREACH (OUTPATIENT)
Dept: FAMILY MEDICINE | Facility: CLINIC | Age: 49
End: 2019-11-20

## 2019-11-20 ENCOUNTER — OFFICE VISIT (OUTPATIENT)
Dept: FAMILY MEDICINE | Facility: CLINIC | Age: 49
End: 2019-11-20
Payer: MEDICAID

## 2019-11-20 VITALS
TEMPERATURE: 98.4 F | WEIGHT: 193.8 LBS | HEIGHT: 66 IN | SYSTOLIC BLOOD PRESSURE: 190 MMHG | DIASTOLIC BLOOD PRESSURE: 135 MMHG | RESPIRATION RATE: 16 BRPM | BODY MASS INDEX: 31.15 KG/M2 | HEART RATE: 65 BPM | OXYGEN SATURATION: 97 %

## 2019-11-20 DIAGNOSIS — I16.9 HYPERTENSIVE CRISIS: ICD-10-CM

## 2019-11-20 DIAGNOSIS — N39.45 CONTINUOUS LEAKAGE OF URINE: ICD-10-CM

## 2019-11-20 DIAGNOSIS — R52 PAIN: ICD-10-CM

## 2019-11-20 DIAGNOSIS — Z59.82 LACK OF ACCESS TO TRANSPORTATION: ICD-10-CM

## 2019-11-20 DIAGNOSIS — I21.4 NSTEMI (NON-ST ELEVATED MYOCARDIAL INFARCTION) (H): Primary | ICD-10-CM

## 2019-11-20 DIAGNOSIS — E78.5 HYPERLIPIDEMIA LDL GOAL <70: ICD-10-CM

## 2019-11-20 DIAGNOSIS — I10 ESSENTIAL HYPERTENSION: ICD-10-CM

## 2019-11-20 DIAGNOSIS — K21.9 GASTROESOPHAGEAL REFLUX DISEASE WITHOUT ESOPHAGITIS: ICD-10-CM

## 2019-11-20 DIAGNOSIS — G47.30 SLEEP APNEA, UNSPECIFIED TYPE: ICD-10-CM

## 2019-11-20 DIAGNOSIS — M79.671 PAIN IN BOTH FEET: ICD-10-CM

## 2019-11-20 DIAGNOSIS — I51.7 CARDIOMEGALY: ICD-10-CM

## 2019-11-20 DIAGNOSIS — M79.672 PAIN IN BOTH FEET: ICD-10-CM

## 2019-11-20 DIAGNOSIS — J45.40 MODERATE PERSISTENT ASTHMA WITHOUT COMPLICATION: ICD-10-CM

## 2019-11-20 RX ORDER — CARVEDILOL 12.5 MG/1
12.5 TABLET ORAL 2 TIMES DAILY WITH MEALS
Qty: 60 TABLET | Refills: 1 | Status: SHIPPED | OUTPATIENT
Start: 2019-11-20 | End: 2020-01-14

## 2019-11-20 RX ORDER — LISINOPRIL 20 MG/1
20 TABLET ORAL DAILY
Qty: 30 TABLET | Refills: 1 | Status: SHIPPED | OUTPATIENT
Start: 2019-11-20 | End: 2020-01-16

## 2019-11-20 RX ORDER — ALBUTEROL SULFATE 90 UG/1
2 AEROSOL, METERED RESPIRATORY (INHALATION) EVERY 6 HOURS
Qty: 1 INHALER | Refills: 3 | Status: SHIPPED | OUTPATIENT
Start: 2019-11-20

## 2019-11-20 RX ORDER — ACETAMINOPHEN 325 MG/1
325-650 TABLET ORAL EVERY 6 HOURS PRN
Qty: 100 TABLET | Refills: 3 | Status: SHIPPED | OUTPATIENT
Start: 2019-11-20 | End: 2020-01-16

## 2019-11-20 RX ORDER — ROSUVASTATIN CALCIUM 20 MG/1
20 TABLET, COATED ORAL DAILY
Qty: 90 TABLET | Refills: 3 | Status: SHIPPED | OUTPATIENT
Start: 2019-11-20 | End: 2020-01-16

## 2019-11-20 RX ORDER — IPRATROPIUM BROMIDE AND ALBUTEROL SULFATE 2.5; .5 MG/3ML; MG/3ML
1 SOLUTION RESPIRATORY (INHALATION) EVERY 6 HOURS PRN
Qty: 30 VIAL | Refills: 3 | Status: SHIPPED | OUTPATIENT
Start: 2019-11-20 | End: 2020-01-16

## 2019-11-20 RX ORDER — FLUTICASONE PROPIONATE 50 MCG
1-2 SPRAY, SUSPENSION (ML) NASAL DAILY
Qty: 16 G | Refills: 11 | Status: SHIPPED | OUTPATIENT
Start: 2019-11-20 | End: 2020-01-16

## 2019-11-20 RX ORDER — FAMOTIDINE 10 MG
20 TABLET ORAL DAILY
Qty: 60 TABLET | Refills: 3 | Status: SHIPPED | OUTPATIENT
Start: 2019-11-20

## 2019-11-20 SDOH — HEALTH STABILITY: MENTAL HEALTH: HOW MANY STANDARD DRINKS CONTAINING ALCOHOL DO YOU HAVE ON A TYPICAL DAY?: 1 OR 2

## 2019-11-20 SDOH — ECONOMIC STABILITY - TRANSPORTATION SECURITY: TRANSPORTATION INSECURITY: Z59.82

## 2019-11-20 ASSESSMENT — ENCOUNTER SYMPTOMS
DIZZINESS: 1
CHILLS: 0
APNEA: 1
PALPITATIONS: 1
ABDOMINAL PAIN: 1
DYSURIA: 0
UNEXPECTED WEIGHT CHANGE: 0
HEADACHES: 1
CONSTIPATION: 0
FEVER: 0
SHORTNESS OF BREATH: 1
SORE THROAT: 0
JOINT SWELLING: 1
ARTHRALGIAS: 1
ROS GI COMMENTS: ACID REFLUX
COUGH: 0
BLOOD IN STOOL: 0
NUMBNESS: 1
DIARRHEA: 0
LIGHT-HEADEDNESS: 1
VOMITING: 0

## 2019-11-20 ASSESSMENT — ANXIETY QUESTIONNAIRES
IF YOU CHECKED OFF ANY PROBLEMS ON THIS QUESTIONNAIRE, HOW DIFFICULT HAVE THESE PROBLEMS MADE IT FOR YOU TO DO YOUR WORK, TAKE CARE OF THINGS AT HOME, OR GET ALONG WITH OTHER PEOPLE: EXTREMELY DIFFICULT
5. BEING SO RESTLESS THAT IT IS HARD TO SIT STILL: NOT AT ALL
6. BECOMING EASILY ANNOYED OR IRRITABLE: NEARLY EVERY DAY
7. FEELING AFRAID AS IF SOMETHING AWFUL MIGHT HAPPEN: NEARLY EVERY DAY
1. FEELING NERVOUS, ANXIOUS, OR ON EDGE: NEARLY EVERY DAY
GAD7 TOTAL SCORE: 18
3. WORRYING TOO MUCH ABOUT DIFFERENT THINGS: NEARLY EVERY DAY
2. NOT BEING ABLE TO STOP OR CONTROL WORRYING: NEARLY EVERY DAY

## 2019-11-20 ASSESSMENT — PATIENT HEALTH QUESTIONNAIRE - PHQ9
5. POOR APPETITE OR OVEREATING: NEARLY EVERY DAY
SUM OF ALL RESPONSES TO PHQ QUESTIONS 1-9: 19

## 2019-11-20 ASSESSMENT — MIFFLIN-ST. JEOR: SCORE: 1502.88

## 2019-11-20 NOTE — LETTER
To Whom it May Concern,    Vivian Sorenson (: 1970), [facility] case number: [id number] assigned to worker Felicia Corrales, is receiving ongoing out-patient care at [facility]. According to the patient's report Vivian was at [facility] in their In-Patient Care from 10/25/2019-10/27/2019 and brought documents to clinic to support this, including her discharge papers that had the dates of admittance and discharge. I am unable to independently verify this through the electronic medical records because [facility] is outside of the [facility] system so I cannot see their records.    Vivian is receiving ongoing care from our clinic. Her primary care provider is Ashley Weiner MD. The patient had an appointment today (19) and she saw one of our third year residents, Rocky Ferguson DO. She has future appointments in clinic scheduled.    Please call if more information is needed.        BRIAN Martinez, [facility]   Direct Number: [phone]

## 2019-11-20 NOTE — PROGRESS NOTES
Talked with pt at the end of the visit with Dr. Ferguson.     Adult Diapers: Pt is okay with the order being faxed to [facility] medical and is expecting them to call to confirm the order.    [employer]: Pt portion of form is filled out and with SW. Provider portion of form is with Dr. Ferguson. SW can send once complete    Letter: pt requested a letter verifying that she is a pt here. She asked that it be sent directly to her  at [facility] signed in pt room. Letter completed and faxed ( is Felicia Corrales Fax number is [phone], Case number [id number]). Pt also asked that the letter include that [facility]. Sw included this as explicitly pt reported information, bc not verifiable by EMR.Pt did have discharge summary with her, which had dates on it and this was mentioned in the letter

## 2019-11-20 NOTE — PROGRESS NOTES
HPI       Ra Louise Sorenson is a 49 year old who presents for hospitalIzation follow-up and to re-establish care at [facility].  Chief Complaint   Patient presents with     Heart Problem     Hospital follow up from [facility]     1. Patient here to re-establish care at [facility] - Patient reports last time she was here was approximately 2.5 years ago (5/2017). Patient and her son moved to [place] about 2 years ago and did not have a PCP there and was not taking medications regularly for about 2 years due to lacking insurance. Moved back to [place] in August 2019. Patient is now enrolled in Medicaid so she would like to re-establish care.     2. Hospitalization at [facility] 10/25-10/27 - Presented to [facility] with CP, SOB, syncope, dizziness, and hypertension. Diagnosed with Cardiomegaly and NSTEMI during this hospital stay and has been taking discharge medications daily since then. In [place], patient was hospitalized 2 times with chest pain, syncope, SOB, hypertension. Only medicines received over past 2 years were through these hospital stays. Femoral angiogram completed in [place]. These symptoms have been occurring more often recently.     3. Dx cardiomegaly and NSTEMI with elevated troponin at [facility] 10/25-10/27 - Patient was hospitalized due to continuously elevating troponin. Discharged her with medicines, which she has been taking since then. Currently enrolled in Medicare. Has not seen a heart specialist yet. ACT heart monitor placed at [facility]. Currently denies CP, Palpitations, dizziness, headache, blurry vision.     4. Hypertension - Not well-controlled. Patient reports 100% compliance with taking Lisinopril since hospital discharge.     5. COPD - Dyspnea when walking up/down stairs. Currently smokes about 5 cigarettes daily. Pt has about a 9 year pack history. Cut down to 5 cigarettes a day 1 year ago.     6. Sleep apnea - had CPAP in past but had to return it went she moved. Reports waking  up SOB, gasping for air every night. Pt has Headaches with blurry vision after taking her medicine in the morning and also in the evening on a daily basis. Reports fatigue throughout the day    7. Chronic pain in joints due to osteoarthritis - Takes acetaminophen 325 mg PRN which helps. Notes intermittent swelling of b/l knees.     8. Asthma - Uses nebulizer at home. Needs Albuterol, Ipratropium - albuterol neb solution, and flonase refills.    9. Pain on palmar surfaces of b/l feet - dx 12/18 with cellulitis, but did not receive medication for this due to lacking insurance. Reports chronic red, swollen, painful f/l palmar surface of b/l feet. Reports numbness and tingling. She states pain is worst with first steps in the morning and when walking long distances.    10. Would like form filled out for [employer] due to difficulty with transportation.     11. Urinary Incontinence - Reported daily leakage of urine. Has prescription for diapers, but needs them to be delivered to home due to lack of access to transportation.     12. GERD - Patient has daily acid reflux and mild abdominal pain following meals. Reports a PMHx of gallbladder stone removal. Denies nausea/vomiting, diarrhea, constipation.     +++++++    Problem, Medication and Allergy Lists were reviewed and updated if needed.    Patient was an established patient of this clinic 2 years ago. She is here to re-establish care.   Past Medical History:   Diagnosis Date     Cardiomegaly      COPD (chronic obstructive pulmonary disease) (H)      Essential hypertension 5/3/2017     Gastroesophageal reflux disease      Hyperlipidemia      Moderate persistent asthma      NSTEMI (non-ST elevated myocardial infarction) (H)      Osteoarthritis      Sleep apnea      Urinary incontinence      Family History   Problem Relation Age of Onset     Arrhythmia Mother         pacemaker     Peripheral Vascular Disease Mother      Diabetes Mother      Hyperlipidemia Mother       Hypertension Mother      Asthma Mother      Cerebrovascular Disease Father         3 strokes     Aneurysm Father      Sleep Apnea Father      Hyperlipidemia Father      Asthma Father      Pacemaker Father      Cancer No family hx of      Social History     Socioeconomic History     Marital status: Single     Spouse name: None     Number of children: None     Years of education: None     Highest education level: None   Occupational History     None   Social Needs     Financial resource strain: None     Food insecurity:     Worry: None     Inability: None     Transportation needs:     Medical: None     Non-medical: None   Tobacco Use     Smoking status: Current Every Day Smoker     Years: 10.00     Types: Cigarettes     Smokeless tobacco: Never Used     Tobacco comment: Smoked 1 pack per day for 9 years, then cut down to 5 per day   Substance and Sexual Activity     Alcohol use: Yes     Alcohol/week: 2.0 standard drinks     Types: 2 Standard drinks or equivalent per week     Drinks per session: 1 or 2     Drug use: No     Sexual activity: Yes     Partners: Male     Birth control/protection: None   Lifestyle     Physical activity:     Days per week: None     Minutes per session: None     Stress: None   Relationships     Social connections:     Talks on phone: None     Gets together: None     Attends Mormonism service: None     Active member of club or organization: None     Attends meetings of clubs or organizations: None     Relationship status: None     Intimate partner violence:     Fear of current or ex partner: None     Emotionally abused: None     Physically abused: None     Forced sexual activity: None   Other Topics Concern     None   Social History Narrative     None            Review of Systems:   Review of Systems   Constitutional: Negative for chills, fever and unexpected weight change.   HENT: Negative for congestion, ear pain and sore throat.    Eyes: Positive for visual disturbance (not acute).         "Blurry vision   Respiratory: Positive for apnea and shortness of breath. Negative for cough.    Cardiovascular: Positive for chest pain and palpitations. Negative for leg swelling.   Gastrointestinal: Positive for abdominal pain. Negative for blood in stool, constipation, diarrhea and vomiting.        Acid reflux   Genitourinary: Negative for dysuria.        Urinary incontinence   Musculoskeletal: Positive for arthralgias and joint swelling.        B/l knee joint swelling   Skin: Negative for rash.   Neurological: Positive for dizziness, syncope, light-headedness, numbness and headaches.        Numbness, tingling, redness, and swelling of b/l palmar surfaces of feet            Physical Exam:     Vitals:    11/20/19 1438   BP: (!) 190/135   Pulse: 65   Resp: 16   Temp: 98.4  F (36.9  C)   TempSrc: Oral   SpO2: 97%   Weight: 87.9 kg (193 lb 12.8 oz)   Height: 1.664 m (5' 5.5\")     Body mass index is 31.76 kg/m .    Vital signs normal except Elevated /135; Repeat 190/125 & Elevated BMI 32.      Physical Exam  Constitutional:       Appearance: Normal appearance. She is obese.   HENT:      Head: Normocephalic and atraumatic.      Nose: Nose normal.      Mouth/Throat:      Mouth: Mucous membranes are moist.      Pharynx: Oropharynx is clear.   Eyes:      Extraocular Movements: Extraocular movements intact.      Conjunctiva/sclera: Conjunctivae normal.      Pupils: Pupils are equal, round, and reactive to light.   Neck:      Musculoskeletal: Normal range of motion and neck supple.   Cardiovascular:      Rate and Rhythm: Normal rate and regular rhythm.      Pulses: Normal pulses.      Heart sounds: Normal heart sounds.   Pulmonary:      Effort: Pulmonary effort is normal.      Breath sounds: Normal breath sounds. No wheezing, rhonchi or rales.   Abdominal:      General: Abdomen is flat. Bowel sounds are normal.      Palpations: Abdomen is soft.      Tenderness: There is abdominal tenderness.      Comments: Tenderness " to palpation of b/l mid abdomen. No rebound, guarding, mass. No HSM. McBurney's point negative for pain or rebound tenderness. No Ignacio's sign.   Musculoskeletal:         General: Swelling and tenderness present.      Right lower leg: No edema.      Left lower leg: No edema.      Comments: Mild swelling and tenderness to palpation of b/l knees   Neurological:      Mental Status: She is alert.      Motor: No weakness.      Comments: monofilament test: Decreased sensation of b/l plantar surfaces of feet using. Intake sensation of toes.    Psychiatric:         Mood and Affect: Mood normal.         Behavior: Behavior normal.           Results:   No testing ordered today    Assessment and Plan    Wilfrid Gil was seen today for hospitalization follow-up and to re-establish care at [facility].     Diagnoses and all orders for this visit:    NSTEMI (non-ST elevated myocardial infarction) (H)  Cardiomegaly  Hyperlipidemia LDL goal <70  - Patient was hospitalized for NSTEMI (with elevated troponin), Cardiomegaly, and Hypertension on 10/25-10/27 at [facility]. Patient had not been taking any medications on a regular basis for the past 2 years due to insurance issues and [place]. Currently enrolled in Medicaid. On discharge, she was restarted on medications and has been taking them regularly since then. She denies CP, SOB, syncope, dizziness today, but notes she has been experiencing these symptoms more often recently. Referral given for Cardiology today. Refilled Coreg and Crestor. Advised patient to return to clinic in 2 weeks in order to continue to establish care and manage complex medical conditions. To complete at next visit: Lipid panel, start patient on ASA.  -     carvedilol (COREG) 12.5 MG tablet; Take 1 tablet (12.5 mg) by mouth 2 times daily (with meals)        -      rosuvastatin (CRESTOR) 20 MG tablet; Take 1 tablet (20 mg) by mouth daily  -     CARDIOLOGY EVAL ADULT REFERRAL - INTERNAL    Essential  hypertension  Hypertensive Crisis  - BP is not well-controlled and is elevated at 190/135 today. As noted previously, Patient had not been taking any medications on a regular basis for the past 2 years. Currently denies CP, HA, blurry vision. Has been taking Lisinopril 10 mg and Carvedilol 12.5. mg since hospital discharge. Increased dose of Lisinopril from 10 mg to 20 mg. Advised patient to return to clinic in 2 weeks for BP check. To complete at next visit: CMP  -     lisinopril (PRINIVIL/ZESTRIL) 20 MG tablet; Take 1 tablet (20 mg) by mouth daily  -     carvedilol (COREG) 12.5 MG tablet; Take 1 tablet (12.5 mg) by mouth 2 times daily (with meals)  -     CARDIOLOGY EVAL ADULT REFERRAL - INTERNAL    Sleep apnea, unspecified type  - Patient has not used a CPAP in about 2 years. Reports waking up nightly gasping for air and with SOB. Referral given for sleep evaluation.   -     SLEEP EVALUATION & MANAGEMENT REFERRAL - ADULT -[facility] [place] [phone] (Age 15 and up); Future    Moderate persistent asthma without complication  - Patient has been using her son's albuterol inhaler intermittently for her symptoms that occur weekly. Advised patient to discontinue this. Refilled albuterol, flonase, and Duoneb today. Will re-evaluate and classify patient's asthma at next visit.  -     albuterol (PROAIR HFA/PROVENTIL HFA/VENTOLIN HFA) 108 (90 Base) MCG/ACT inhaler; Inhale 2 puffs into the lungs every 6 hours  -     fluticasone (FLONASE) 50 MCG/ACT nasal spray; Spray 1-2 sprays into both nostrils daily  -     ipratropium - albuterol 0.5 mg/2.5 mg/3 mL (DUONEB) 0.5-2.5 (3) MG/3ML neb solution; Take 1 vial (3 mLs) by nebulization every 6 hours as needed for shortness of breath / dyspnea or wheezing    Pain  - Patient is experiencing joint pain due to chronic OA. Pain is moderately controlled on Acetaminophen. Refilled Acetaminophen today and will discuss other options for pain at next visit.   -      acetaminophen (TYLENOL) 325 MG tablet; Take 1-2 tablets (325-650 mg) by mouth every 6 hours as needed for mild pain or fever    Gastroesophageal reflux disease without esophagitis  - Patient has been taking omeprazole for acid reflux since hospital discharge. Re-started patient on famotidine instead today to be used as a daily reflux maintenance medication. Will follow-up on mild abdominal pain following eating at next visit. Differential includes Cholelithiasis, gastritis, indigestion, IBD.   -     famotidine (PEPCID) 10 MG tablet; Take 2 tablets (20 mg) by mouth daily    Pain in both feet  - Pain reports chronic redness, swelling, pain, numbness, tingling of b/l palmar surfaces of feet. On PE, patient has decreased sensation on b/l palmar surfaces. Most likely diagnosis includes plantar fascitis due to pain with first steps in the morning and when walking long distances. Differential includes diabetic neuropathy, cellulitis, and somatic dysfunction. Advised patient to refrain from walking long distances and wear shoes with protective soles. Will follow-up at next visit. To complete at next visit: A1C.     Continuous leakage of urine  - Placed order for diapers through DME. Working with  to find a way to get these delivered to patient's home due to transportation issues.   -     order for DME; Equipment being ordered: Adult diapers for urinary continence    Lack of access to transportation  - Patient does not meet criteria for [employer]. She has chronic pain from OA; however, should still have the ability to use public transportation. Will hold off on filling out paperwork for now and will discuss with patient and  at next visit. This patient would benefit from frequent clinic visits and frequent visits with  & care coordinator in order to properly manage multiple complex medical conditions.     BMI 31.0-31.9, adult  - Counseled patient briefly on healthy eating and  exercise strategies. Will discuss more in depth at next visit.        Medications Discontinued During This Encounter   Medication Reason     zolpidem (AMBIEN) 5 MG tablet      amLODIPine (NORVASC) 5 MG tablet Medication Reconciliation Clean Up     atorvastatin (LIPITOR) 40 MG tablet Dose adjustment     mometasone-formoterol (DULERA) 200-5 MCG/ACT oral inhaler      naproxen sodium (ANAPROX) 220 MG tablet      oxybutynin (DITROPAN-XL) 5 MG 24 hr tablet      Vitamin D, Cholecalciferol, 1000 UNITS TABS      ACETAMINOPHEN PO Reorder     albuterol (PROAIR HFA/PROVENTIL HFA/VENTOLIN HFA) 108 (90 BASE) MCG/ACT Inhaler Reorder     famotidine (PEPCID) 10 MG tablet Reorder     fluticasone (FLONASE) 50 MCG/ACT spray Reorder     ipratropium - albuterol 0.5 mg/2.5 mg/3 mL (DUONEB) 0.5-2.5 (3) MG/3ML neb solution Reorder       Options for treatment and follow-up care were reviewed with the patient. Wilfrid Sorenson  engaged in the decision making process and verbalized understanding of the options discussed and agreed with the final plan.    Brielle Lincoln, OMS4    I, Rocky Ferguson, was present with the medical student who participated in the service and in the documentation of the note.  I have verified the history and personally performed the physical exam and medical decision making.  I agree with the assessment and plan of care as documented in the note.        Rocky Ferguson DO  Family Medicine PGY-3

## 2019-11-20 NOTE — PROGRESS NOTES
Preceptor Attestation:   Patient seen, evaluated and discussed with the resident. I have verified the content of the note, which accurately reflects my assessment of the patient and the plan of care.   Supervising Physician:  Tamy Linn MD

## 2019-11-20 NOTE — PROGRESS NOTES
HPI       Leah Sorenson is a 49 year old  who presents for hospital follow-up and to re-establish care.   Chief Complaint   Patient presents with     Heart Problem     [facility] on 10/27/2019     1. Patient here to re-establish care at [facility] - Patient reports last time she was here was approximately 2.5 years ago (5/2017). Patient and her son moved to [place] about 2 years ago and did not have a PCP there and was not taking medications regularly for about 2 years due to lacking insurance. Moved back to [place] in August 2019. Patient is now enrolled in Medicare so she would like to re-establish care.     2. Hospitalization at [facility] 10/25-10/27 - Presented to [facility] with CP, SOB, syncope, dizziness, and hypertension. Diagnosed with Cardiomegaly and NSTEMI during this hospital stay and has been taking discharge medications daily since then. In [place], patient was hospitalized 2 times with chest pain, syncope, SOB, hypertension. Only medicines received over past 2 years were through these hospital stays. Femoral angiogram completed in [place]. These symptoms have been occurring more often recently.     3. Dx cardiomegaly and NSTEMI with elevated troponin at [facility] 10/25-10/27 - Patient was hospitalized due to continuously elevating troponin. Discharged her with medicines, which she has been taking since then. Currently enrolled in Medicare. Has not seen a heart specialist yet. ACT heart monitor placed at [facility]. Currently denies CP, Palpitations, dizziness, headache, blurry vision.     4. Hypertension - Not well-controlled. Patient reports 100% compliance with taking Lisinopril since hospital discharge.     5. COPD - Dyspnea when walking up/down stairs. Currently smokes about 5 cigarettes daily. Pt has about a 9 year pack history. Cut down to 5 cigarettes a day 1 year ago.     6. Sleep apnea - had CPAP in past but had to return it went she moved. Reports waking up SOB, gasping for air every  night. Pt has Headaches with blurry vision after taking her medicine in the morning and also in the evening on a daily basis. Reports fatigue throughout the day    7. Chronic pain due to osteoarthritis - Takes acetaminophen 325 mg PRN which helps    8. Asthma - Uses nebulizer at home. Needs Albuterol, Ipratropium - albuterol neb solution, and flonase refills.    9. Pain on palmar surfaces of b/l feet - dx 12/18 with cellulitis, but did not receive medication for this due to lacking insurance. Reports chronic red, swollen, painful f/l palmar surface of b/l feet. Reports numbness and tingling.    10. Needs form filling out for [employer]     11. Urinary Incontinence - Has prescription for diapers, but needs them to be delivered to home due to lack of access to transportation.     +++++++  {Additional Concerns  (FM):396173}    Problem, Medication and Allergy Lists were reviewed and updated if needed.    Patient was an established patient of this clinic 2 years ago. She is here to re-establish care.   Past Medical History:   Diagnosis Date     Cardiomegaly      COPD (chronic obstructive pulmonary disease) (H)      Essential hypertension 5/3/2017     Gastroesophageal reflux disease      Hyperlipidemia      Moderate persistent asthma      NSTEMI (non-ST elevated myocardial infarction) (H)      Osteoarthritis      Sleep apnea      Urinary incontinence      Family History   Problem Relation Age of Onset     Arrhythmia Mother         pacemaker     Peripheral Vascular Disease Mother      Diabetes Mother      Hyperlipidemia Mother      Hypertension Mother      Asthma Mother      Cerebrovascular Disease Father         3 strokes     Aneurysm Father      Sleep Apnea Father      Hyperlipidemia Father      Asthma Father      Pacemaker Father      Cancer No family hx of      Social History     Socioeconomic History     Marital status: Single     Spouse name: None     Number of children: None     Years of education: None      "Highest education level: None   Occupational History     None   Social Needs     Financial resource strain: None     Food insecurity:     Worry: None     Inability: None     Transportation needs:     Medical: None     Non-medical: None   Tobacco Use     Smoking status: Current Every Day Smoker, 5 per day     Years: 10.00, 9 pack year history     Types: Cigarettes     Smokeless tobacco: Never Used   Substance and Sexual Activity     Alcohol use: Yes, 2 drinks per week     Drug use: No     Sexual activity: Yes     Partners: Male     Birth control/protection: None   Lifestyle     Physical activity:     Days per week: None     Minutes per session: None     Stress: None   Relationships     Social connections:     Talks on phone: None     Gets together: None     Attends Oriental orthodox service: None     Active member of club or organization: None     Attends meetings of clubs or organizations: None     Relationship status: None     Intimate partner violence:     Fear of current or ex partner: None     Emotionally abused: None     Physically abused: None     Forced sexual activity: None   Other Topics Concern     None   Social History Narrative     None   .         Review of Systems:   Review of Systems         Physical Exam:     Vitals:    11/20/19 1318 11/20/19 1320   BP: (!) 190/135 (!) 190/125   Pulse: 65    Resp: 16    Temp: 98.4  F (36.9  C)    TempSrc: Oral    SpO2: 97%    Weight: 87.9 kg (193 lb 12.8 oz)    Height: 1.664 m (5' 5.5\")      Body mass index is 31.76 kg/m .  Vital signs normal except Elevated /135; Repeat 190/125     Physical Exam      Results:   No testing ordered today    Assessment and Plan     Wilfrid Gil was seen today for heart problem.    Diagnoses and all orders for this visit:    NSTEMI (non-ST elevated myocardial infarction) (H)  -     carvedilol (COREG) 12.5 MG tablet; Take 1 tablet (12.5 mg) by mouth 2 times daily (with meals)  -     CARDIOLOGY EVAL ADULT REFERRAL - INTERNAL    Essential " hypertension  -     lisinopril (PRINIVIL/ZESTRIL) 20 MG tablet; Take 1 tablet (20 mg) by mouth daily  -     carvedilol (COREG) 12.5 MG tablet; Take 1 tablet (12.5 mg) by mouth 2 times daily (with meals)  -     CARDIOLOGY EVAL ADULT REFERRAL - INTERNAL    Sleep apnea, unspecified type  -     SLEEP EVALUATION & MANAGEMENT REFERRAL - ADULT -[facility] [place] [phone] (Age 15 and up); Future    Moderate persistent asthma without complication  -     albuterol (PROAIR HFA/PROVENTIL HFA/VENTOLIN HFA) 108 (90 Base) MCG/ACT inhaler; Inhale 2 puffs into the lungs every 6 hours  -     fluticasone (FLONASE) 50 MCG/ACT nasal spray; Spray 1-2 sprays into both nostrils daily  -     ipratropium - albuterol 0.5 mg/2.5 mg/3 mL (DUONEB) 0.5-2.5 (3) MG/3ML neb solution; Take 1 vial (3 mLs) by nebulization every 6 hours as needed for shortness of breath / dyspnea or wheezing    Pain  -     acetaminophen (TYLENOL) 325 MG tablet; Take 1-2 tablets (325-650 mg) by mouth every 6 hours as needed for mild pain or fever    Hyperlipidemia LDL goal <130  -     rosuvastatin (CRESTOR) 20 MG tablet; Take 1 tablet (20 mg) by mouth daily    Gastroesophageal reflux disease without esophagitis  -     famotidine (PEPCID) 10 MG tablet; Take 2 tablets (20 mg) by mouth daily    Cardiomegaly  -     CARDIOLOGY EVAL ADULT REFERRAL - INTERNAL    Continuous leakage of urine  -     order for DME; Equipment being ordered: Adult diapers for urinary continence           There are no discontinued medications.    Options for treatment and follow-up care were reviewed with the patient. Leah Sorenson  engaged in the decision making process and verbalized understanding of the options discussed and agreed with the final plan.    Brielle Lincoln, OMS4    Rocky Ferguson DO

## 2019-11-21 ENCOUNTER — TELEPHONE (OUTPATIENT)
Dept: FAMILY MEDICINE | Facility: CLINIC | Age: 49
End: 2019-11-21

## 2019-11-21 DIAGNOSIS — N39.45 CONTINUOUS LEAKAGE OF URINE: Primary | ICD-10-CM

## 2019-11-21 ASSESSMENT — ANXIETY QUESTIONNAIRES: GAD7 TOTAL SCORE: 18

## 2019-11-21 NOTE — TELEPHONE ENCOUNTER
Verify that the refill encounter hasn't been started Yes    [facility] phone call message- patient requesting a refill:    Full Medication Name: order for DME    Dose: Adult diapers for urinary      Pharmacy confirmed as   [facility]  Phone: [phone] Fax:   : Yes    Medication tab checked to see if medication has been sent  Yes    Additional Comments: Needs pads added to this order. [facility] will be faxing this request also.      OK to leave a message on voice mail? Yes    Advised patient refill may take up to 2 business days? Yes    Primary language: English      needed? No    Call taken on November 21, 2019 at 9:47 AM by Kelly Marquis    Route to P [facility] MED REFILL

## 2019-11-27 ENCOUNTER — OFFICE VISIT (OUTPATIENT)
Dept: FAMILY MEDICINE | Facility: CLINIC | Age: 49
End: 2019-11-27
Payer: MEDICAID

## 2019-11-27 ENCOUNTER — TELEPHONE (OUTPATIENT)
Dept: PSYCHOLOGY | Facility: CLINIC | Age: 49
End: 2019-11-27

## 2019-11-27 VITALS
TEMPERATURE: 98.6 F | OXYGEN SATURATION: 99 % | DIASTOLIC BLOOD PRESSURE: 112 MMHG | RESPIRATION RATE: 18 BRPM | BODY MASS INDEX: 31.37 KG/M2 | WEIGHT: 195.2 LBS | SYSTOLIC BLOOD PRESSURE: 169 MMHG | HEART RATE: 70 BPM | HEIGHT: 66 IN

## 2019-11-27 DIAGNOSIS — F33.1 MODERATE EPISODE OF RECURRENT MAJOR DEPRESSIVE DISORDER (H): ICD-10-CM

## 2019-11-27 DIAGNOSIS — I10 ESSENTIAL HYPERTENSION: ICD-10-CM

## 2019-11-27 DIAGNOSIS — M17.0 OSTEOARTHRITIS OF BOTH KNEES, UNSPECIFIED OSTEOARTHRITIS TYPE: ICD-10-CM

## 2019-11-27 DIAGNOSIS — Z11.4 SCREENING FOR HIV (HUMAN IMMUNODEFICIENCY VIRUS): ICD-10-CM

## 2019-11-27 DIAGNOSIS — Z12.31 ENCOUNTER FOR SCREENING MAMMOGRAM FOR BREAST CANCER: ICD-10-CM

## 2019-11-27 DIAGNOSIS — G47.30 SLEEP APNEA, UNSPECIFIED TYPE: ICD-10-CM

## 2019-11-27 DIAGNOSIS — Z12.11 SPECIAL SCREENING FOR MALIGNANT NEOPLASMS, COLON: ICD-10-CM

## 2019-11-27 DIAGNOSIS — E11.9 TYPE 2 DIABETES MELLITUS WITHOUT COMPLICATION, WITHOUT LONG-TERM CURRENT USE OF INSULIN (H): Primary | ICD-10-CM

## 2019-11-27 PROBLEM — J44.9 COPD (CHRONIC OBSTRUCTIVE PULMONARY DISEASE) (H): Status: ACTIVE | Noted: 2019-10-25

## 2019-11-27 PROBLEM — E11.65 POORLY CONTROLLED TYPE 2 DIABETES MELLITUS WITH PERIPHERAL NEUROPATHY (H): Status: ACTIVE | Noted: 2019-10-25

## 2019-11-27 PROBLEM — R79.89 TROPONIN LEVEL ELEVATED: Status: ACTIVE | Noted: 2019-10-25

## 2019-11-27 PROBLEM — J45.909 ASTHMA: Status: ACTIVE | Noted: 2017-05-03

## 2019-11-27 LAB
BUN SERPL-MCNC: 5.7 MG/DL (ref 7–19)
CALCIUM SERPL-MCNC: 9.5 MG/DL (ref 8.5–10.1)
CHLORIDE SERPLBLD-SCNC: 101 MMOL/L (ref 98–110)
CHOLEST SERPL-MCNC: 155.4 MG/DL (ref 0–200)
CHOLEST/HDLC SERPL: 4.2 {RATIO} (ref 0–5)
CO2 SERPL-SCNC: 26.5 MMOL/L (ref 20–32)
CREAT SERPL-MCNC: 0.7 MG/DL (ref 0.5–1)
GFR SERPL CREATININE-BSD FRML MDRD: >90 ML/MIN/1.7 M2
GLUCOSE SERPL-MCNC: 114.2 MG'DL (ref 70–99)
HBA1C MFR BLD: 6.1 % (ref 4.1–5.7)
HDLC SERPL-MCNC: 37 MG/DL
HIV 1+2 AB+HIV1 P24 AG SERPL QL IA: NONREACTIVE
LDLC SERPL CALC-MCNC: 70 MG/DL (ref 0–129)
POTASSIUM SERPL-SCNC: 3.9 MMOL/DL (ref 3.3–4.5)
SODIUM SERPL-SCNC: 137.6 MMOL/L (ref 132.6–141.4)
TRIGL SERPL-MCNC: 243.6 MG/DL (ref 0–150)
VLDL CHOLESTEROL: 48.7 MG/DL (ref 7–32)

## 2019-11-27 RX ORDER — AMLODIPINE BESYLATE 5 MG/1
5 TABLET ORAL DAILY
Qty: 30 TABLET | Refills: 1 | Status: SHIPPED | OUTPATIENT
Start: 2019-11-27 | End: 2020-01-16

## 2019-11-27 RX ORDER — ASPIRIN 81 MG/1
81 TABLET ORAL DAILY
COMMUNITY
Start: 2017-07-29

## 2019-11-27 ASSESSMENT — PATIENT HEALTH QUESTIONNAIRE - PHQ9
5. POOR APPETITE OR OVEREATING: NEARLY EVERY DAY
SUM OF ALL RESPONSES TO PHQ QUESTIONS 1-9: 18

## 2019-11-27 ASSESSMENT — ANXIETY QUESTIONNAIRES
7. FEELING AFRAID AS IF SOMETHING AWFUL MIGHT HAPPEN: NEARLY EVERY DAY
3. WORRYING TOO MUCH ABOUT DIFFERENT THINGS: NEARLY EVERY DAY
6. BECOMING EASILY ANNOYED OR IRRITABLE: NEARLY EVERY DAY
GAD7 TOTAL SCORE: 19
5. BEING SO RESTLESS THAT IT IS HARD TO SIT STILL: SEVERAL DAYS
IF YOU CHECKED OFF ANY PROBLEMS ON THIS QUESTIONNAIRE, HOW DIFFICULT HAVE THESE PROBLEMS MADE IT FOR YOU TO DO YOUR WORK, TAKE CARE OF THINGS AT HOME, OR GET ALONG WITH OTHER PEOPLE: SOMEWHAT DIFFICULT
1. FEELING NERVOUS, ANXIOUS, OR ON EDGE: NEARLY EVERY DAY
2. NOT BEING ABLE TO STOP OR CONTROL WORRYING: NEARLY EVERY DAY

## 2019-11-27 ASSESSMENT — ENCOUNTER SYMPTOMS
SHORTNESS OF BREATH: 0
NUMBNESS: 1
ARTHRALGIAS: 1
DIZZINESS: 0
SLEEP DISTURBANCE: 1
CHILLS: 0
HEADACHES: 1
FEVER: 0
WEAKNESS: 1

## 2019-11-27 ASSESSMENT — MIFFLIN-ST. JEOR: SCORE: 1517.17

## 2019-11-27 NOTE — TELEPHONE ENCOUNTER
Scheduled with Dr. Heaton on 12/6 at 9:40 am.     Mental Health Referral:  Please schedule with Dr. heaton      Please let patient know this is for primary care behavioral health services.  Therapists at our clinic are able to see patients for 8-12 sessions. If further assistance is needed, they will help the patient connect with ongoing services in the community.    If you are unable to reach the patient after two phone attempts, please send a letter and close the encounter.      Thank you!

## 2019-11-27 NOTE — PROGRESS NOTES
Preceptor Attestation:   Patient seen and discussed with the resident. Assessment and plan reviewed with resident and agreed upon.   Supervising Physician:  DO [facility]

## 2019-11-27 NOTE — PROGRESS NOTES
[facility]       Ra Louise Sorenson is a 49 year old  who presents for   Chief Complaint   Patient presents with     Hypertension     Heart Problem     Sleep Apnea     headache in morning and sometime late afternoon      Diabetes Follow-up  <7.0 goal    Patient is checking blood sugars: rarely, does not remember numbers         -Last A1C was   Lab Results   Component Value Date    A1C 6.2 05/03/2017   6.4 on 10/25/19 at outside system        Diabetic concerns: None    Chest Pain or exercise related calf pain (claudication):no     Symptoms of hypoglycemia (low blood sugar): none     Paresthesias (numbness or burning in feet) or sores: Yes      Diabetic eye exam within the last year?: No    Hyperlipidemia Follow-Up      Recommended Level of Therapy:High Intensity Statin (atorvastatin 40*-80 mg or rosuvastatin 20-40mg)    Rate your low fat/cholesterol diet?: fair    Taking statin?  Yes, no muscle aches from statin    Other lipid medications/supplements?:  none  Lab Results   Component Value Date    CHOL 262.8 05/03/2017     Lab Results   Component Value Date    HDL 36.4 05/03/2017     Lab Results   Component Value Date     05/03/2017     Lab Results   Component Value Date    TRIG 178.7 05/03/2017     Lab Results   Component Value Date    CHOLHDLRATIO 7.2 05/03/2017       Hypertension Follow-up   goal <140/90    Outpatient blood pressures are not being checked.    Low Salt Diet: no added salt    Daily NSAID Use?no     Did patient take their HTN pills today?  Yes  Last Basic Metabolic Panel:  Lab Results   Component Value Date    .3 05/03/2017      Lab Results   Component Value Date    POTASSIUM 3.7 05/03/2017     Lab Results   Component Value Date    CHLORIDE 103.6 05/03/2017     Lab Results   Component Value Date    ABY 9.6 05/03/2017     Lab Results   Component Value Date    CO2 26.3 05/03/2017     Lab Results   Component Value Date    BUN 8.7 05/03/2017     Lab Results   Component Value Date    CR 0.7  "05/03/2017     Lab Results   Component Value Date    .8 05/03/2017         Adherence and Exercise  Medication side effects: no  How often is a medication missed? Less than 1 time per week  Exercise: none      +++++++    Depression/Anxiety follow up      Your mood since your last visit: feeling down and stressed about living situation     Medication? none    Therapy? none    Exercise? none    What is going well? Getting medical care    Other associated symptoms :None    How is your sleep? Poor    Some \"panic\" symptoms      Recent PHQ-9 Scores:     PHQ-9 SCORE 11/20/2019   PHQ-9 Total Score 19     Recent ANTWAN-7 Scores:      ANTWAN-7 SCORE 11/20/2019   Total Score 18         Today' sPHQ 9 Reviewed and it is same as prior.       Knee pain: Bilateral, for some time. Has not tried PT, injections. Tylenol is helping minimally. Walks with cane in winter for stability.     Problem, Medication and Allergy Lists were reviewed and updated if needed..    Patient is an established patient of this clinic..         Review of Systems:   Review of Systems   Constitutional: Negative for chills and fever.   Eyes: Negative for visual disturbance.   Respiratory: Negative for shortness of breath.    Cardiovascular: Negative for chest pain and leg swelling.   Musculoskeletal: Positive for arthralgias and gait problem.   Neurological: Positive for weakness, numbness and headaches. Negative for dizziness.   Psychiatric/Behavioral: Positive for sleep disturbance.            Physical Exam:     Vitals:    11/27/19 0943   BP: (!) 169/112   Pulse: 70   Resp: 18   Temp: 98.6  F (37  C)   TempSrc: Oral   SpO2: 99%   Weight: 88.5 kg (195 lb 3.2 oz)   Height: 1.676 m (5' 6\")     Body mass index is 31.51 kg/m .  Vital signs normal except elevated BP     Physical Exam  Constitutional:       General: She is not in acute distress.     Appearance: She is well-developed. She is obese.   HENT:      Head: Normocephalic and atraumatic.   Eyes:      " General: No scleral icterus.     Extraocular Movements: Extraocular movements intact.      Pupils: Pupils are equal, round, and reactive to light.   Cardiovascular:      Rate and Rhythm: Normal rate and regular rhythm.      Pulses: Normal pulses.      Heart sounds: Normal heart sounds. No murmur.      Comments: Heart monitor in place  Pulmonary:      Effort: Pulmonary effort is normal. No respiratory distress.      Breath sounds: Normal breath sounds.   Abdominal:      Palpations: Abdomen is soft.   Musculoskeletal:         General: Tenderness (bilateral medial joint line of knees) present.   Skin:     General: Skin is warm and dry.   Neurological:      General: No focal deficit present.      Mental Status: She is alert and oriented to person, place, and time.        Diabetic Foot Screen:  Any complaints of increased pain or numbness ?  YES   Is there a foot ulcer now or a history of foot ulcer? No   Does the foot have an abnormal shape? No   Are the nails thick, too long or ingrown? No   Are there any redness or open areas? No            Sensation Testing done at all points on the diagram with monofilament     Right Foot: Sensation Normal at all points  Left Foot: Sensation Normal at all points     Risk Category: 0- No loss of protective sensation  Performed by Rocky Ferguson DO      MENTAL STATUS EXAM  Appearance: appropriate  Attitude: cooperative  Behavior: normal  Eye Contact: normal  Speech: normal  Orientation: oriented to person, place, time and situation  Mood: depressed and anxious  Affect: Congruent with mood  Thought Process: appropriate  Suicidal Ideation: reports no suicidal ideation  Hallucination: denies    Results:      Results from this visit  Results for orders placed or performed in visit on 11/27/19   Hemoglobin A1c ([facility])     Status: Abnormal   Result Value Ref Range    Hemoglobin A1C 6.1 (H) 4.1 - 5.7 %   And additional pending and in process    Assessment and Plan      Wilfrid Gil was seen  today for hypertension, heart problem and sleep apnea.    Diagnoses and all orders for this visit:    Type 2 diabetes mellitus without complication, without long-term current use of insulin (H)  -     Hemoglobin A1c ([facility])  -     Lipid Cascade ([facility])  -     OPHTHALMOLOGY ADULT REFERRAL  Pt with well controlled DM today. Reports neuropathy, though sensation intact on feet. A1c well controlled on diet. Lipids ordered. Has not had eye exam in over a yea and referred today. Will check MAC next time.      Essential hypertension  Sleep apnea  -     Basic Metabolic Panel ([facility])  -     amLODIPine (NORVASC) 5 MG tablet; Take 1 tablet (5 mg) by mouth daily  Pt with sleep apnea and uncontrolled. HTN. Added CCB and checked BMP today. Will recheck in 2 weeks. Pt is awaiting sleep eval and replacement of CPAP if indicated.    Moderate episode of recurrent major depressive disorder (H)  -     BEHAVIORAL HEALTH REFERRAL ([facility] interal and external)  Pt with uncontrolled mood symptoms and not on medication or counseling. Referred for . Psychologist was able to see today for brief intervention in integrated care clinic. Will schedule further sessions here at [facility] Will continue to discuss medications, though pt would prefer to defer additional meds today.     BMI 31.0-31.9,adult  Osteoarthritis of both knees, unspecified osteoarthritis type  -     МАРИНА, PT, HAND AND CHIROPRACTIC REFERRAL - МАРИНА; Future  Pt with history of bilateral OA. Takes tylenol with minimal relief. NSAIDs are contraindicated in DM. Will consider topicals in future but would like pt to start therapy at this time.    Screening for HIV (human immunodeficiency virus)  -     HIV Antigen Antibody Combo  Once in lifetime screening today     Encounter for screening mammogram for breast cancer  -     Screening Mammogram Digital Bilateral; Future  Screening ordered    Special screening for malignant neoplasms, colon  -     GASTROENTEROLOGY ADULT REF  PROCEDURE ONLY  Referred for screening     Return in 2 weeks.        Medications Discontinued During This Encounter   Medication Reason     acetaminophen (TYLENOL) 325 MG tablet Medication Reconciliation Clean Up     carvedilol (COREG) 12.5 MG tablet Medication Reconciliation Clean Up     ipratropium - albuterol 0.5 mg/2.5 mg/3 mL (DUONEB) 0.5-2.5 (3) MG/3ML neb solution Medication Reconciliation Clean Up     lisinopril (PRINIVIL/ZESTRIL) 10 MG tablet Medication Reconciliation Clean Up     order for DME Medication Reconciliation Clean Up     rosuvastatin (CRESTOR) 20 MG tablet Medication Reconciliation Clean Up       Options for treatment and follow-up care were reviewed with the patient. Wilfrid Sorenson  engaged in the decision making process and verbalized understanding of the options discussed and agreed with the final plan.    Rocky Ferguson DO

## 2019-11-28 ASSESSMENT — ANXIETY QUESTIONNAIRES: GAD7 TOTAL SCORE: 19

## 2019-11-29 ENCOUNTER — TELEPHONE (OUTPATIENT)
Dept: FAMILY MEDICINE | Facility: CLINIC | Age: 49
End: 2019-11-29
Payer: MEDICAID

## 2019-11-29 NOTE — TELEPHONE ENCOUNTER
Pt seen and referred to  on 11/27. Was able to see Dr. Araya for brief intervention at that time. I do agree with UC/ER assessment if patient feels that she is unsafe to herself or others.     Pt had multiple medical concerns and with agenda setting at the front end of the visit on 11/27, and together we discussed what we could given prioritized list. I invited pt back for quick follow up in 1 week but pt noted concerns about transportation.     Recommend at next office visit (by myself or other provider) exploring serotonin specific reuptake inhibitor vs atarax for anxiety. Would not recommend benzos for symptoms.     Rocky Ferguson, DO

## 2019-11-29 NOTE — TELEPHONE ENCOUNTER
Reviewed chart and pt does have Mental Health intake on 12/6- 0940 AM with Dr. Heaton.     Rocky Ferguson, DO

## 2019-11-29 NOTE — TELEPHONE ENCOUNTER
"Called and spoke with patient who states that she woke up this morning feeling nervous and agitated.  She states that her mind feels confused and she's overall frustrated.  States that she was just in clinic ( 11/27/19) and that she did not receive any \"help concerning this issue\".  When asked what she would like she stated \" someone to talk to and medication.\"  She stated that she's been on \"medication prior\" but couldn't tell RN what she had been taking.  Patient denies SI and HI but did state that she feels that if people frustrate her anymore she \"could punch someone\".  Clinic had no open appointments for this afternoon currently.  Recommended that patient go to Urgent care/ER.  Patient stated that is where she was going to go.    Pt verbalizes understanding of the directions and information. Gave pt opportunity to ask additional questions or address concerns.     Routed to Dr. Ferguson/RODO Burr RN    "

## 2019-11-29 NOTE — TELEPHONE ENCOUNTER
[facility] phone call message-patient reporting a symptom:     Symptom: Severe panic attack / nervous breakdown     When did symptoms begin? Today    Characteristics: (location on body, intensity, what makes it better or worse, associated symptoms): UNKNOWN    Additional Details: Patient calling in to speak with mental health specialist, PCP, or RN. Patient would not discloes any other information other than having a severe panic attack / nervous breakdown and needing to speak with someone ASAP.       Same Day Visit Offered: Yes, declined    Additional comments: See above     OK to leave message on voice mail? Yes    Advised patient that RN would call back within 3 hours, unless emergent   Primary language: English      needed? No    Call taken on November 29, 2019 at 12:13 PM by Imelda Yeager

## 2019-12-02 ENCOUNTER — DOCUMENTATION ONLY (OUTPATIENT)
Dept: FAMILY MEDICINE | Facility: CLINIC | Age: 49
End: 2019-12-02

## 2019-12-02 NOTE — PROGRESS NOTES
"When opening a documentation only encounter, be sure to enter in \"Chief Complaint\" Forms and in \" Comments\" Title of form, description if needed.    Wilfrid Gil is a 49 year old  female  Form received via: Fax  Form now resides in: Provider Ready    Rachael Menjivar CMA                  Form has been completed by provider.     Form sent out via: Fax to [phone]  Patient informed: No, Reason: fax confirmed  Output date: December 3, 2019    Rachael Menjivar CMA      **Please close the encounter**      "

## 2019-12-03 ENCOUNTER — DOCUMENTATION ONLY (OUTPATIENT)
Dept: FAMILY MEDICINE | Facility: CLINIC | Age: 49
End: 2019-12-03

## 2019-12-03 ENCOUNTER — OFFICE VISIT (OUTPATIENT)
Dept: OPHTHALMOLOGY | Facility: CLINIC | Age: 49
End: 2019-12-03
Attending: OPHTHALMOLOGY
Payer: MEDICAID

## 2019-12-03 DIAGNOSIS — H52.203 MYOPIA OF BOTH EYES WITH ASTIGMATISM AND PRESBYOPIA: ICD-10-CM

## 2019-12-03 DIAGNOSIS — H35.033 HYPERTENSIVE RETINOPATHY OF BOTH EYES, GRADE 1: Primary | ICD-10-CM

## 2019-12-03 DIAGNOSIS — H52.13 MYOPIA OF BOTH EYES WITH ASTIGMATISM AND PRESBYOPIA: ICD-10-CM

## 2019-12-03 DIAGNOSIS — H52.4 MYOPIA OF BOTH EYES WITH ASTIGMATISM AND PRESBYOPIA: ICD-10-CM

## 2019-12-03 DIAGNOSIS — E11.9 TYPE 2 DIABETES MELLITUS WITHOUT COMPLICATION, WITHOUT LONG-TERM CURRENT USE OF INSULIN (H): ICD-10-CM

## 2019-12-03 PROCEDURE — G0463 HOSPITAL OUTPT CLINIC VISIT: HCPCS | Mod: ZF

## 2019-12-03 PROCEDURE — 92015 DETERMINE REFRACTIVE STATE: CPT | Mod: ZF

## 2019-12-03 ASSESSMENT — VISUAL ACUITY
OS_PH_SC: 20/25
OD_SC+: -1
METHOD: SNELLEN - LINEAR
OD_SC: 20/40
OD_PH_SC: 20/30
OS_SC: 20/100
OD_PH_SC+: -1
OS_SC+: -1

## 2019-12-03 ASSESSMENT — REFRACTION_MANIFEST
OS_AXIS: 162
OS_CYLINDER: +0.25
OS_SPHERE: -2.00
OS_ADD: +2.50
OD_ADD: +2.50
OD_AXIS: 170
OD_CYLINDER: +0.50
OD_SPHERE: -1.50

## 2019-12-03 ASSESSMENT — SLIT LAMP EXAM - LIDS
COMMENTS: NORMAL
COMMENTS: NORMAL

## 2019-12-03 ASSESSMENT — TONOMETRY
OS_IOP_MMHG: 20
IOP_METHOD: TONOPEN
OD_IOP_MMHG: 16

## 2019-12-03 ASSESSMENT — CONF VISUAL FIELD
OS_NORMAL: 1
METHOD: COUNTING FINGERS
OD_NORMAL: 1

## 2019-12-03 ASSESSMENT — EXTERNAL EXAM - RIGHT EYE: OD_EXAM: NORMAL

## 2019-12-03 ASSESSMENT — CUP TO DISC RATIO
OS_RATIO: 0.1
OD_RATIO: 0.1

## 2019-12-03 ASSESSMENT — EXTERNAL EXAM - LEFT EYE: OS_EXAM: NORMAL

## 2019-12-03 NOTE — NURSING NOTE
Chief Complaints and History of Present Illnesses   Patient presents with     Eye Exam For Diabetes     Chief Complaint(s) and History of Present Illness(es)     Eye Exam For Diabetes     Laterality: both eyes    Associated symptoms: tearing.  Negative for redness, dryness and eye pain              Comments     She has Type 2 diabetes.   Her vision has seemed stable in both eyes for the past couple years.  She does not wear glasses or contact lenses for distance or near vision.    Lab Results       Component                Value               Date                       A1C                      6.1                 11/27/2019                 A1C                      6.2                 05/03/2017           CASTILLO Ballard 8:46 AM  December 3, 2019

## 2019-12-03 NOTE — LETTER
12/3/2019       RE: Wilfrid Sorenson  [address]  [place] [address]     Dear Colleague,    Thank you for referring your patient, Wilfrid Sorenson, to the EYE CLINIC at [facility]. Please see a copy of my visit note below.    HPI:  Wilfrid Sorenson is a 49 year old female here for ophthalmic evaluation.     HPI     Eye Exam For Diabetes     In both eyes.  Associated symptoms include tearing.  Negative for redness, dryness and eye pain.              Comments     She has Type 2 diabetes.   Her vision has seemed stable in both eyes for the past couple years.  She does not wear glasses or contact lenses for distance or near vision.    Lab Results       Component                Value               Date                       A1C                      6.1                 11/27/2019                 A1C                      6.2                 05/03/2017           CASTILLO Ballard 8:46 AM  December 3, 2019                  Last edited by Indy Mancia COT on 12/3/2019  8:46 AM. (History)      Additional history: no vision changes, referred here for diabetic eye exam, does reports some intermittent tearing     POH: none   PMH: Diabetes mellitus II, HTN, asthma  FHx: no glaucoma    SHx: 1/2 PPD    Ocular Meds: none      ASSESSMENT & PLAN:    1. Hypertensive retinopathy of both eyes, grade 1    2. Type 2 diabetes mellitus without complication, without long-term current use of insulin (H)    3. Myopia of both eyes with astigmatism and presbyopia         - No diabetic retinopathy on exam today; mild AV nicking noted both eyes   - Discussed need for annual dilated exams  - Discussed importance of good BP/BG control with PCP   - Improved vision with refraction, new spectacles prescribed today     Patient disposition: Return in about 1 year (around 12/3/2020) for VTD.     Easton Flores MD  Ophthalmology Resident, PGY-4  [facility]       Attending  Physician Attestation:  Complete documentation of historical and exam elements from today's encounter can be found in the full encounter summary report (not reduplicated in this progress note).  I personally obtained the chief complaint(s) and history of present illness.  I confirmed and edited as necessary the review of systems, past medical/surgical history, family history, social history, and examination findings as documented by others; and I examined the patient myself.  I personally reviewed the relevant tests, images, and reports as documented above.  I formulated and edited as necessary the assessment and plan and discussed the findings and management plan with the patient and family. . - Aj Emery MD         Again, thank you for allowing me to participate in the care of your patient.      Sincerely,    Aj Emery MD

## 2019-12-03 NOTE — PROGRESS NOTES
HPI:  Wilfrid Sorenson is a 49 year old female here for ophthalmic evaluation.     HPI     Eye Exam For Diabetes     In both eyes.  Associated symptoms include tearing.  Negative for redness, dryness and eye pain.              Comments     She has Type 2 diabetes.   Her vision has seemed stable in both eyes for the past couple years.  She does not wear glasses or contact lenses for distance or near vision.    Lab Results       Component                Value               Date                       A1C                      6.1                 11/27/2019                 A1C                      6.2                 05/03/2017           CASTILLO Ballard 8:46 AM  December 3, 2019                  Last edited by Indy Mancia COT on 12/3/2019  8:46 AM. (History)      Additional history: no vision changes, referred here for diabetic eye exam, does reports some intermittent tearing     POH: none   PMH: Diabetes mellitus II, HTN, asthma  FHx: no glaucoma    SHx: 1/2 PPD    Ocular Meds: none      ASSESSMENT & PLAN:    1. Hypertensive retinopathy of both eyes, grade 1    2. Type 2 diabetes mellitus without complication, without long-term current use of insulin (H)    3. Myopia of both eyes with astigmatism and presbyopia         - No diabetic retinopathy on exam today; mild AV nicking noted both eyes   - Discussed need for annual dilated exams  - Discussed importance of good BP/BG control with PCP   - Improved vision with refraction, new spectacles prescribed today     Patient disposition: Return in about 1 year (around 12/3/2020) for VTD.     Easton Flores MD  Ophthalmology Resident, PGY-4  [facility]       Attending Physician Attestation:  Complete documentation of historical and exam elements from today's encounter can be found in the full encounter summary report (not reduplicated in this progress note).  I personally obtained the chief complaint(s) and history of present illness.  I confirmed and  edited as necessary the review of systems, past medical/surgical history, family history, social history, and examination findings as documented by others; and I examined the patient myself.  I personally reviewed the relevant tests, images, and reports as documented above.  I formulated and edited as necessary the assessment and plan and discussed the findings and management plan with the patient and family. . - Aj Emery MD

## 2019-12-03 NOTE — PROGRESS NOTES
Form has been completed by provider.     Form sent out via: Fax to [phone]  Patient informed: No, Reason: fax confirmed  Output date: December 3, 2019    Ashley Benoit    **Please close the encounter**

## 2019-12-04 PROBLEM — H35.033 HYPERTENSIVE RETINOPATHY OF BOTH EYES, GRADE 1: Status: ACTIVE | Noted: 2019-12-04

## 2019-12-09 ENCOUNTER — DOCUMENTATION ONLY (OUTPATIENT)
Dept: FAMILY MEDICINE | Facility: CLINIC | Age: 49
End: 2019-12-09

## 2019-12-09 NOTE — PROGRESS NOTES
"When opening a documentation only encounter, be sure to enter in \"Chief Complaint\" Forms and in \" Comments\" Title of form, description if needed.    Wilfrid Gil is a 49 year old  female  Form received via: Patient Drop Off  Form now resides in: Provider Ready    Zandra Wellington CMA                Form has been completed by provider.     Form sent out via: Fax to [facility]  at Fax Number: [phone]  Patient informed: NA  Output date: December 9, 2019    Zandra Wellington CMA      **Please close the encounter**      "

## 2019-12-13 ENCOUNTER — ANCILLARY PROCEDURE (OUTPATIENT)
Dept: MAMMOGRAPHY | Facility: CLINIC | Age: 49
End: 2019-12-13
Attending: FAMILY MEDICINE
Payer: MEDICAID

## 2019-12-13 DIAGNOSIS — Z12.31 ENCOUNTER FOR SCREENING MAMMOGRAM FOR BREAST CANCER: ICD-10-CM

## 2019-12-13 NOTE — TELEPHONE ENCOUNTER
RECORDS RECEIVED FROM: Care Everywhere/Internal   DATE RECEIVED: 1-13-20   NOTES STATUS DETAILS   OFFICE NOTE from referring provider    N/A    OFFICE NOTE from other cardiologist    N/A    DISCHARGE SUMMARY from hospital    Care Everywhere    DISCHARGE REPORT from the ER   Care Everywhere 11-23-18   OPERATIVE REPORT    Care Everywhere 11-24-18 cath   MEDICATION LIST   Internal    LABS     BMP   Internal 11-27-19   CBC   Care Everywhere 10-25-19   CMP   N/A    Lipids   Internal 11-27-19   TSH   N/A    DIAGNOSTIC PROCEDURES     EKG   Care Everywhere 10-26-19   Monitor Reports   Care Everywhere 12-11-19   IMAGING (DISC & REPORT)      Echo   In process 10-26-19   Stress Tests   N/A    Cath   N/A    MRI/MRA   N/A    CT/CTA   N/A      Action    Action Taken 12-13: Requested echo 10-26-19 from [facility]  12-19: resolved echo in PACS

## 2019-12-16 ENCOUNTER — DOCUMENTATION ONLY (OUTPATIENT)
Dept: CARE COORDINATION | Facility: CLINIC | Age: 49
End: 2019-12-16

## 2019-12-26 ENCOUNTER — TELEPHONE (OUTPATIENT)
Dept: GASTROENTEROLOGY | Facility: CLINIC | Age: 49
End: 2019-12-26

## 2019-12-26 NOTE — TELEPHONE ENCOUNTER
Patient Name: Wilfrid Sorenson   : 1970  MRN: [id number]       : [x] N/A       Msg left with daughter with request that pt return call Friday to complete pre-assessment call.  Request pt contact Endoscopy Pre-assessment RN to complete upcoming procedure information.  Telephone call-back number provided.    Kinza Tobias RN  [facility]    Additional Information regarding appointment:  _____________________________________________________      Patient scheduled for:    [x] Colonoscopy      Indication for procedure. [x] Screening      Sedation Type: [x] Conscious Sedation       Procedure Provider:  Keanu      Referring Provider. Ondina Waller; Rocky Ferguson (PCP)    Arrival time verified: .3.1000    Facility location verified:   [facility] [address]  [address] [place] [address]  __________________________________________________

## 2020-01-10 NOTE — PROGRESS NOTES
[facility] Initial Evaluation    Subjective:  Wilfrid Louise Sorenson is a 49 year old female with a bilateral knee condition. Symptoms commenced as a result of: chronic knee pain for over 5 years related to bilateral knee OA. Condition occurred in the following environment: NA. Onset of symptoms: Date on order: November 2019. Location of symptoms: anterior knees, thighs. Pain level on number scale: 6/10. Quality of pain: stabbing. Associated symptoms: stiffness, swelling, giving way, weakness, limping. Pain frequency (constant/intermittent): constant. Symptoms are exacerbated by: walking, stairs, standing, kneeling, squatting, sit<>stand, sitting. Symptoms are relieved by: rest. Progression of symptoms since onset (same/better/worse): worse. Special tests (x-ray, MRI, CT scan, EMG, bone scan): None. Previous treatment: None. Improvement with previous treatment: NA. General health as reported by patient is fair. Pertinent medical history includes: See Epic. Medical allergies: see Epic. Other pertinent surgeries: see Epic. Current medications: See Epic. Occupation: None. Patient is (working in normal job without restrictions/working in normal job with restrictions/working in an alternate job/not working due to present treatment problem): NA. Primary job tasks: NA. Barriers at home/work: None reported by patient. Red flags: None reported by patient.    Objective  Gait:  SEC, decreased terminal knee extension bilaterally    Knee AROM (* = pain) Right Left   FL WNL* WNL*   EXT WNL* WNL*     Knee Strength (* = pain) Right Left   FL 4/5 4/5   EXT 4/5 4/5   Quad Contraction (Good/Fair/Poor) fair fair   Hip ABD NT/5 NT/5     Palpation Tenderness:  Bilateral lateral knee joints    Assessment/Plan:    Patient is a 49 year old female with both sides knee complaints.    Patient has the following significant findings with corresponding treatment plan.                Diagnosis 1:  Bilateral knee pain  Pain -   manual therapy, self management, education, directional preference exercise and home program  Decreased ROM/flexibility - manual therapy and therapeutic exercise  Decreased strength - therapeutic exercise and therapeutic activities  Impaired balance - neuro re-education and therapeutic activities  Decreased proprioception - neuro re-education and therapeutic activities  Impaired gait - gait training  Impaired muscle performance - neuro re-education  Decreased function - therapeutic activities  Impaired posture - neuro re-education    Therapy Evaluation Codes:   1) History comprised of:   Personal factors that impact the plan of care:      None.    Comorbidity factors that impact the plan of care are:      Asthma, Diabetes, Emphysema, Heart problems, High blood pressure, Menopausal, Numbness/tingling, Osteoarthritis, Rheumatoid arthritis, Sleep disorder/apnea and Smoking.     Medications impacting care: None.  2) Examination of Body Systems comprised of:   Body structures and functions that impact the plan of care:      Knee.   Activity limitations that impact the plan of care are:      Bathing, Bending, Cooking, Driving, Dressing, Jumping, Lifting, Running, Sitting, Sports, Squatting/kneeling, Stairs, Standing and Walking.  3) Clinical presentation characteristics are:   Stable/Uncomplicated.  4) Decision-Making    Low complexity using standardized patient assessment instrument and/or measureable assessment of functional outcome.  Cumulative Therapy Evaluation is: Low complexity.    Previous and current functional limitations:  (See Goal Flow Sheet for this information)    Short term and Long term goals: (See Goal Flow Sheet for this information)     Communication ability:  Patient appears to be able to clearly communicate and understand verbal and written communication and follow directions correctly.  Treatment Explanation - The following has been discussed with the patient:   RX ordered/plan of care  Anticipated  outcomes  Possible risks and side effects  This patient would benefit from PT intervention to resume normal activities.   Rehab potential is good.    Frequency:  1 X week, once daily  Duration:  for 4 weeks tapering to 2 X a month over 1 month  Discharge Plan:  Achieve all LTG.  Independent in home treatment program.  Reach maximal therapeutic benefit.    Please refer to the daily flowsheet for treatment today, total treatment time and time spent performing 1:1 timed codes.

## 2020-01-13 ENCOUNTER — PRE VISIT (OUTPATIENT)
Dept: CARDIOLOGY | Facility: CLINIC | Age: 50
End: 2020-01-13

## 2020-01-14 ENCOUNTER — OFFICE VISIT (OUTPATIENT)
Dept: CARDIOLOGY | Facility: CLINIC | Age: 50
End: 2020-01-14
Attending: INTERNAL MEDICINE
Payer: MEDICAID

## 2020-01-14 ENCOUNTER — THERAPY VISIT (OUTPATIENT)
Dept: PHYSICAL THERAPY | Facility: CLINIC | Age: 50
End: 2020-01-14
Payer: MEDICAID

## 2020-01-14 ENCOUNTER — MEDICAL CORRESPONDENCE (OUTPATIENT)
Dept: HEALTH INFORMATION MANAGEMENT | Facility: CLINIC | Age: 50
End: 2020-01-14

## 2020-01-14 VITALS
WEIGHT: 180 LBS | HEIGHT: 65 IN | DIASTOLIC BLOOD PRESSURE: 120 MMHG | SYSTOLIC BLOOD PRESSURE: 199 MMHG | OXYGEN SATURATION: 98 % | HEART RATE: 83 BPM | BODY MASS INDEX: 29.99 KG/M2

## 2020-01-14 DIAGNOSIS — M17.0 OSTEOARTHRITIS OF BOTH KNEES, UNSPECIFIED OSTEOARTHRITIS TYPE: ICD-10-CM

## 2020-01-14 DIAGNOSIS — I21.4 NSTEMI (NON-ST ELEVATED MYOCARDIAL INFARCTION) (H): ICD-10-CM

## 2020-01-14 DIAGNOSIS — I10 ESSENTIAL HYPERTENSION: Primary | ICD-10-CM

## 2020-01-14 DIAGNOSIS — M25.562 CHRONIC PAIN OF BOTH KNEES: ICD-10-CM

## 2020-01-14 DIAGNOSIS — M25.561 CHRONIC PAIN OF BOTH KNEES: ICD-10-CM

## 2020-01-14 DIAGNOSIS — G89.29 CHRONIC PAIN OF BOTH KNEES: ICD-10-CM

## 2020-01-14 PROCEDURE — G0463 HOSPITAL OUTPT CLINIC VISIT: HCPCS | Mod: 25,ZF

## 2020-01-14 PROCEDURE — 99204 OFFICE O/P NEW MOD 45 MIN: CPT | Mod: GC | Performed by: INTERNAL MEDICINE

## 2020-01-14 PROCEDURE — 93010 ELECTROCARDIOGRAM REPORT: CPT | Mod: ZP | Performed by: INTERNAL MEDICINE

## 2020-01-14 PROCEDURE — 97161 PT EVAL LOW COMPLEX 20 MIN: CPT | Mod: GP | Performed by: PHYSICAL THERAPIST

## 2020-01-14 PROCEDURE — 93005 ELECTROCARDIOGRAM TRACING: CPT | Mod: ZF

## 2020-01-14 PROCEDURE — 97110 THERAPEUTIC EXERCISES: CPT | Mod: GP | Performed by: PHYSICAL THERAPIST

## 2020-01-14 RX ORDER — CARVEDILOL 25 MG/1
25 TABLET ORAL 2 TIMES DAILY WITH MEALS
Qty: 60 TABLET | Refills: 3 | Status: SHIPPED | OUTPATIENT
Start: 2020-01-14

## 2020-01-14 ASSESSMENT — ACTIVITIES OF DAILY LIVING (ADL)
GO DOWN STAIRS: ACTIVITY IS VERY DIFFICULT
GO UP STAIRS: ACTIVITY IS VERY DIFFICULT
HOW_WOULD_YOU_RATE_THE_CURRENT_FUNCTION_OF_YOUR_KNEE_DURING_YOUR_USUAL_DAILY_ACTIVITIES_ON_A_SCALE_FROM_0_TO_100_WITH_100_BEING_YOUR_LEVEL_OF_KNEE_FUNCTION_PRIOR_TO_YOUR_INJURY_AND_0_BEING_THE_INABILITY_TO_PERFORM_ANY_OF_YOUR_USUAL_DAILY_ACTIVITIES?: 0
RAW_SCORE: 10.77
SIT WITH YOUR KNEE BENT: ACTIVITY IS VERY DIFFICULT
WALK: ACTIVITY IS VERY DIFFICULT
LIMPING: THE SYMPTOM PREVENTS ME FROM ALL DAILY ACTIVITIES
WEAKNESS: THE SYMPTOM PREVENTS ME FROM ALL DAILY ACTIVITIES
STIFFNESS: NOT ANSWERED
AS_A_RESULT_OF_YOUR_KNEE_INJURY,_HOW_WOULD_YOU_RATE_YOUR_CURRENT_LEVEL_OF_DAILY_ACTIVITY?: SEVERELY ABNORMAL
GIVING WAY, BUCKLING OR SHIFTING OF KNEE: THE SYMPTOM PREVENTS ME FROM ALL DAILY ACTIVITIES
HOW_WOULD_YOU_RATE_THE_OVERALL_FUNCTION_OF_YOUR_KNEE_DURING_YOUR_USUAL_DAILY_ACTIVITIES?: SEVERELY ABNORMAL
RISE FROM A CHAIR: ACTIVITY IS SOMEWHAT DIFFICULT
SWELLING: THE SYMPTOM PREVENTS ME FROM ALL DAILY ACTIVITIES
KNEE_ACTIVITY_OF_DAILY_LIVING_SCORE: 15.39
KNEEL ON THE FRONT OF YOUR KNEE: ACTIVITY IS VERY DIFFICULT
PAIN: THE SYMPTOM PREVENTS ME FROM ALL DAILY ACTIVITIES
STAND: ACTIVITY IS VERY DIFFICULT
KNEE_ACTIVITY_OF_DAILY_LIVING_SUM: 10
SQUAT: ACTIVITY IS VERY DIFFICULT

## 2020-01-14 ASSESSMENT — ENCOUNTER SYMPTOMS
LOSS OF CONSCIOUSNESS: 1
SLEEP DISTURBANCES DUE TO BREATHING: 0
POOR WOUND HEALING: 0
SEIZURES: 0
NIGHT SWEATS: 0
DECREASED APPETITE: 0
ALTERED TEMPERATURE REGULATION: 1
HYPERTENSION: 1
FEVER: 0
FATIGUE: 0
JOINT SWELLING: 1
POLYDIPSIA: 0
FLANK PAIN: 0
TINGLING: 1
ORTHOPNEA: 0
TREMORS: 0
HYPOTENSION: 0
NUMBNESS: 1
MYALGIAS: 1
WEAKNESS: 0
MUSCLE WEAKNESS: 1
POLYPHAGIA: 0
HALLUCINATIONS: 0
WEIGHT GAIN: 0
DIZZINESS: 1
DECREASED CONCENTRATION: 1
HEMATURIA: 0
INCREASED ENERGY: 1
WEIGHT LOSS: 0
CHILLS: 0
BACK PAIN: 1
DISTURBANCES IN COORDINATION: 0
SYNCOPE: 1
MEMORY LOSS: 1
DYSURIA: 0
STIFFNESS: 1
NAIL CHANGES: 0
PARALYSIS: 0
PANIC: 1
ARTHRALGIAS: 1
DEPRESSION: 0
NECK PAIN: 1
LIGHT-HEADEDNESS: 0
SKIN CHANGES: 0
HEADACHES: 1
MUSCLE CRAMPS: 1
INSOMNIA: 1
NERVOUS/ANXIOUS: 1
SPEECH CHANGE: 0
EXERCISE INTOLERANCE: 0
DIFFICULTY URINATING: 0
LEG PAIN: 1
PALPITATIONS: 0

## 2020-01-14 ASSESSMENT — MIFFLIN-ST. JEOR: SCORE: 1432.35

## 2020-01-14 ASSESSMENT — PAIN SCALES - GENERAL: PAINLEVEL: NO PAIN (0)

## 2020-01-14 NOTE — LETTER
DEPARTMENT OF HEALTH AND HUMAN SERVICES  [facility] MEDICA[facility]    PLAN/UPDATED PLAN OF PROGRESS FOR OUTPATIENT REHABILITATION    PATIENTS NAME:  Wilfrid Sorenson     : 1970    PR[facility] NUMBER:    [id number]    [id number]      [facility]    MEDICAL RECORD NUMBER: [id number]     START OF CARE DATE:  SOC Date: 20   TYPE:  PT    PRIMARY/TREATMENT DIAGNOSIS: (Pertinent Medical Diagnosis)     Osteoarthritis of both knees, unspecified osteoarthritis type  Chronic pain of both knees    VISITS FROM START OF CARE:  Rxs Used: 1     [facility] Initial Evaluation    Subjective:  Wilfrid Sorenson is a 49 year old female with a bilateral knee condition. Symptoms commenced as a result of: chronic knee pain for over 5 years related to bilateral knee OA. Condition occurred in the following environment: NA. Onset of symptoms: Date on order: 2019. Location of symptoms: anterior knees, thighs. Pain level on number scale: 6/10. Quality of pain: stabbing. Associated symptoms: stiffness, swelling, giving way, weakness, limping. Pain frequency (constant/intermittent): constant. Symptoms are exacerbated by: walking, stairs, standing, kneeling, squatting, sit<>stand, sitting. Symptoms are relieved by: rest. Progression of symptoms since onset (same/better/worse): worse. Special tests (x-ray, MRI, CT scan, EMG, bone scan): None. Previous treatment: None. Improvement with previous treatment: NA. General health as reported by patient is fair. Pertinent medical history includes: See Epic. Medical allergies: see Epic. Other pertinent surgeries: see Epic. Current medications: See Epic. Occupation: None. Patient is (working in normal job without restrictions/working in normal job with restrictions/working in an alternate job/not working due to present treatment problem): NA. Primary job tasks: NA. Barriers at home/work: None reported by  patient. Red flags: None reported by patient.    Objective  Gait:  SEC, decreased terminal knee extension bilaterally    Knee AROM (* = pain) Right Left   FL WNL* WNL*   EXT WNL* WNL*     Wilfrid Sorenson         Knee Strength (* = pain) Right Left   FL 4/5 4/5   EXT 4/5 4/5   Quad Contraction (Good/Fair/Poor) fair fair   Hip ABD NT/5 NT/5     Palpation Tenderness:  Bilateral lateral knee joints    Assessment/Plan:    Patient is a 49 year old female with both sides knee complaints.    Patient has the following significant findings with corresponding treatment plan.                Diagnosis 1:  Bilateral knee pain  Pain -  manual therapy, self management, education, directional preference exercise and home program  Decreased ROM/flexibility - manual therapy and therapeutic exercise  Decreased strength - therapeutic exercise and therapeutic activities  Impaired balance - neuro re-education and therapeutic activities  Decreased proprioception - neuro re-education and therapeutic activities  Impaired gait - gait training  Impaired muscle performance - neuro re-education  Decreased function - therapeutic activities  Impaired posture - neuro re-education    Therapy Evaluation Codes:   1) History comprised of:   Personal factors that impact the plan of care:      None.    Comorbidity factors that impact the plan of care are:      Asthma, Diabetes, Emphysema, Heart problems, High blood pressure, Menopausal, Numbness/tingling, Osteoarthritis, Rheumatoid arthritis, Sleep disorder/apnea and Smoking.     Medications impacting care: None.  2) Examination of Body Systems comprised of:   Body structures and functions that impact the plan of care:      Knee.   Activity limitations that impact the plan of care are:      Bathing, Bending, Cooking, Driving, Dressing, Jumping, Lifting, Running, Sitting, Sports, Squatting/kneeling, Stairs, Standing and Walking.  3) Clinical presentation characteristics  "are:   Stable/Uncomplicated.  4) Decision-Making    Low complexity using standardized patient assessment instrument and/or measureable assessment of functional outcome.  Cumulative Therapy Evaluation is: Low complexity.    Previous and current functional limitations:  (See Goal Flow Sheet for this information)    Short term and Long term goals: (See Goal Flow Sheet for this information)     Communication ability:  Patient appears to be able to clearly communicate and understand verbal and written communication and follow directions correctly.  Wilfrid Sorenson         Treatment Explanation - The following has been discussed with the patient:   RX ordered/plan of care  Anticipated outcomes  Possible risks and side effects  This patient would benefit from PT intervention to resume normal activities.   Rehab potential is good.    Frequency:  1 X week, once daily  Duration:  for 4 weeks tapering to 2 X a month over 1 month  Discharge Plan:  Achieve all LTG.  Independent in home treatment program.  Reach maximal therapeutic benefit.    Please refer to the daily flowsheet for treatment today, total treatment time and time spent performing 1:1 timed codes.     Caregiver Signature/Credentials _____________________________ Date ________       Treating Provider: Rickie Kennedy DPT   I have reviewed and certified the need for these services and plan of treatment while under my care.        PHYSICIAN'S SIGNATURE:   ____________________________________  Date___________     Ondina Waller DO    Certification period:  Beginning of Cert date period: 01/14/20 to  End of Cert period date: 04/07/20     Functional Level Progress Report: Please see attached \"Goal Flow sheet for Functional level.\"    ____X____ Continue Services or       ________ DC Services                Service dates: From  SOC Date: 01/14/20 date to present                         "

## 2020-01-14 NOTE — LETTER
"1/14/2020      RE: Wilfrid Sorenson  [address]  [place] [address]       Dear Colleague,    Thank you for the opportunity to participate in the care of your patient, Wilfrid Sorenson, at the [facility]. Please see a copy of my visit note below.    CARDIOLOGY CLINIC NOTE  01/14/2020    HPI:  The patient is a 49-year-old woman with a past medical history notable for hypertension, type 2 diabetes mellitus, and multiple admissions for non-STEMI's who is presenting to establish care with cardiology.    The patient's cardiac history dates back to at least 2004.  There is documentation that she underwent a coronary angiogram at that time.  More recently, the patient has had typical anginal chest pains which are noted by a chest tightness which radiates down her arm and feels numb in her arm.  They are not necessarily worse with exertion and have no clear relationship to her blood pressure.  However, this has led to multiple coronary angiograms in the setting of elevated troponin levels.  Most recently, this was performed at [facility] where she was found to have no evidence of coronary artery disease.  At the time, her blood pressure was greater than 180 systolic and her LVEDP was 26 mmHg. She was started on antihypertensives and reports compliance with these, however her blood pressure still remains elevated today.  She continues to have these intermittent chest pains.    She does carry a historical diagnosis of \"cardiomyopathy\" as well.  Based on the last 2 echocardiograms, there is no evidence for this.  I suspect that she was told she had a \"enlarged heart\".  However, this was meant to refer to left ventricular hypertrophy, rather than left ventricular dilation.  Her last LVIDD was 3.9 cm.  She further has no symptoms of heart failure including orthopnea, PND, or lower extremity swelling.    The patient is also being seen for " syncope and presyncope.  The patient reports a history of multiple different forms of syncope.  1 form dates back to her her being a child when she would have a profound reaction to storms.  When at school, she would asked to use the restroom when thunder would, and would later be found on the floor in the hallway.  She would have some prodromal symptoms associated with this.  She continues to have intermittent syncopal episodes with similar associated symptoms.  She also has episodes of syncope with orthostasis.  She finally also has intermittent episodes of syncope that are associated with palpitation.  The latter episodes are less clear to her and she is unable to describe these.  She recently had a Holter monitor placed by [facility] which she brought into clinic today.    ROS:  A complete 10-point ROS was negative except as above.    PAST MEDICAL HISTORY:  Past Medical History:   Diagnosis Date     Cardiomegaly      Cellulitis     b/l feet     COPD (chronic obstructive pulmonary disease) (H)      Diabetes (H)      Essential hypertension      Essential hypertension 5/3/2017     Gastroesophageal reflux disease      Hyperlipidemia      Langerhan's cell histiocytosis (H)      Moderate persistent asthma      NSTEMI (non-ST elevated myocardial infarction) (H)      Osteoarthritis     b/l ankles, feet, hands     Osteoarthritis      Poorly controlled type 2 diabetes mellitus with peripheral neuropathy (H) 10/25/2019     Sleep apnea      Urinary incontinence        PAST SURGICAL HISTORY:  Past Surgical History:   Procedure Laterality Date      SECTION      4 C/S     CHOLECYSTECTOMY      2014     EXCISE HIDRADENITIS (LOCATION)  2016     TONSILLECTOMY & ADENOIDECTOMY         FAMILY HISTORY:  Family History   Problem Relation Age of Onset     Arrhythmia Mother         pacemaker     Peripheral Vascular Disease Mother      Diabetes Mother      Hyperlipidemia Mother      Hypertension Mother      Asthma Mother       Cerebrovascular Disease Father         3 strokes     Aneurysm Father      Sleep Apnea Father      Hyperlipidemia Father      Asthma Father      Pacemaker Father      Cancer No family hx of        SOCIAL HISTORY:  Social History     Socioeconomic History     Marital status: Single     Spouse name: Not on file     Number of children: Not on file     Years of education: Not on file     Highest education level: Not on file   Occupational History     Not on file   Social Needs     Financial resource strain: Not on file     Food insecurity:     Worry: Not on file     Inability: Not on file     Transportation needs:     Medical: Not on file     Non-medical: Not on file   Tobacco Use     Smoking status: Current Every Day Smoker     Packs/day: 0.50     Years: 10.00     Pack years: 5.00     Types: Cigarettes     Smokeless tobacco: Never Used     Tobacco comment: Smoked 1 pack per day for 9 years, then cut down to 5 per day   Substance and Sexual Activity     Alcohol use: Yes     Alcohol/week: 2.0 standard drinks     Types: 2 Standard drinks or equivalent per week     Drinks per session: 1 or 2     Comment: 2 weekly     Drug use: No     Sexual activity: Yes     Partners: Male     Birth control/protection: None   Lifestyle     Physical activity:     Days per week: Not on file     Minutes per session: Not on file     Stress: Not on file   Relationships     Social connections:     Talks on phone: Not on file     Gets together: Not on file     Attends Yazidism service: Not on file     Active member of club or organization: Not on file     Attends meetings of clubs or organizations: Not on file     Relationship status: Not on file     Intimate partner violence:     Fear of current or ex partner: Not on file     Emotionally abused: Not on file     Physically abused: Not on file     Forced sexual activity: Not on file   Other Topics Concern     Not on file   Social History Narrative    ** Merged History Encounter **       "      MEDICATIONS:  Current Outpatient Medications   Medication     acetaminophen (TYLENOL) 325 MG tablet     albuterol (PROAIR HFA/PROVENTIL HFA/VENTOLIN HFA) 108 (90 Base) MCG/ACT inhaler     amLODIPine (NORVASC) 5 MG tablet     aspirin 81 MG EC tablet     carvedilol (COREG) 12.5 MG tablet     famotidine (PEPCID) 10 MG tablet     fluticasone (FLONASE) 50 MCG/ACT nasal spray     ipratropium - albuterol 0.5 mg/2.5 mg/3 mL (DUONEB) 0.5-2.5 (3) MG/3ML neb solution     lisinopril (PRINIVIL/ZESTRIL) 20 MG tablet     nicotine (NICORETTE) 2 MG gum     nitroGLYcerin (NITROSTAT) 0.4 MG sublingual tablet     omeprazole (PRILOSEC) 20 MG DR capsule     order for DME     rosuvastatin (CRESTOR) 20 MG tablet     No current facility-administered medications for this visit.        ALLERGIES:     Allergies   Allergen Reactions     Iodine      Ciprofloxacin Rash     Iodine Other (See Comments), Itching and Rash     Burning sensation       PHYSICAL EXAM:  BP (!) 199/120 (BP Location: Right arm, Patient Position: Chair, Cuff Size: Adult Regular)   Pulse 83   Ht 1.651 m (5' 5\")   Wt 81.6 kg (180 lb)   LMP  (LMP Unknown)   SpO2 98%   BMI 29.95 kg/m     Gen: alert, interactive, NAD  HEENT: atraumatic, EOMI, MMM  Neck: supple, no JVD  CV: RRR, no m/r/g  Chest: CTAB, no wheezes or crackles  Abd: soft, NT, ND, +BS  Ext: no LE edema, 2+ peripheral pulses  Skin: warm and dry, no rashes on exposed surfaces  Neuro: alert and oriented, no focal deficits    LABS:    CMP  Last Comprehensive Metabolic Panel:  Sodium   Date Value Ref Range Status   11/27/2019 137.6 132.6 - 141.4 mmol/L Final     Potassium   Date Value Ref Range Status   11/27/2019 3.9 3.3 - 4.5 mmol/dL Final     Chloride   Date Value Ref Range Status   11/27/2019 101.0 98.0 - 110.0 mmol/L Final     Carbon Dioxide   Date Value Ref Range Status   11/27/2019 26.5 20.0 - 32.0 mmol/L Final     Glucose   Date Value Ref Range Status   11/27/2019 114.2 (H) 70.0 - 99.0 mg'dL Final "     Urea Nitrogen   Date Value Ref Range Status   11/27/2019 5.7 (L) 7.0 - 19.0 mg/dL Final     Creatinine   Date Value Ref Range Status   11/27/2019 0.7 0.5 - 1.0 mg/dL Final     GFR Estimate   Date Value Ref Range Status   11/27/2019 >90 >60.0 mL/min/1.7 m2 Final     Calcium   Date Value Ref Range Status   11/27/2019 9.5 8.5 - 10.1 mg/dL Final     Bilirubin Total   Date Value Ref Range Status   05/03/2017 <0.4 0.2 - 1.3 mg/dL Final     Alkaline Phosphatase   Date Value Ref Range Status   05/03/2017 53.5 31.7 - 110.5 U/L Final     ALT   Date Value Ref Range Status   05/03/2017 25.3 0.0 - 45.0 U/L Final     AST   Date Value Ref Range Status   05/03/2017 13.6 0.0 - 45.0 U/L Final       CBC  CBC RESULTS: No results for input(s): WBC, RBC, HGB, HCT, MCV, MCH, MCHC, RDW, PLT in the last [id number] hours.    TROP  No results found for: TROPI, TROPONIN, TROPR, TROPN    LIPIDS  Recent Labs   Lab Test 11/27/19  0922 05/03/17  1221   CHOL 155.4 262.8*   HDL 37.0* 36.4*   LDL 70 191*   TRIG 243.6* 178.7*   CHOLHDLRATIO 4.2 7.2*       TSH  TSH   Date Value Ref Range Status   05/03/2017 0.34 (L) 0.40 - 4.00 mU/L Final       HBA1C  Lab Results   Component Value Date    A1C 6.1 11/27/2019    A1C 6.2 05/03/2017       CARDIAC DATA:  EKG 10/14/20 NSR, severe LVH with repolarization     TTE 10/26/19 ([facility])  Normal LV and RV size and systolic function. No gross regional wall motion abnormalities identified. Moderate to severe LVH. LVEF 65- 70%. Grade 2 diastolic filling pattern (moderate increase in filling pressure).     Cath 11/26/18  SELECTIVE CORONARY ANGIOGRAPHY:  1.  Left main coronary artery:  The left main coronary artery is normal.  2.  Left anterior descending coronary artery:  The left anterior   descending coronary artery does not have any significant disease.  3.  Left circumflex coronary artery:  The left circumflex coronary artery   is normal.  4.  Right coronary artery:  The right coronary artery is a dominant  vessel   without any significant disease.    LVEDP 26 mmHg    ASSESSMENT/PLAN:  In summary, this is a 49-year-old female who is presenting to cardiology to establish care for the following conditions:    1.  Hypertension with severe features: The patient has had multiple episodes of troponin elevations associated with elevated blood pressures.  This has led to multiple coronary angiograms which showed non evidence of coronary artery disease.  I suspect that the patient's high blood pressure has not only led to the multiple episodes of non-STEMI's, but is also causing her chest pains.  In addition, this has likely led to impaired blood pressure response to standing which is at least partially contributing to her syncopal episodes.  Given her severe LVH on both EKG and echocardiogram, she needs aggressive care for her blood pressure.  We will plan today to increase her carvedilol to 25 mg twice per day.  We will have her follow-up in 6 weeks.  If she remains uncontrolled, suggest the addition of a diuretic to her lisinopril.    2.  Syncope: The patient has at least 3 forms of syncope.  One is clearly vasovagal and the second is clearly orthostatic.  It is unclear to me if there is a third component which was preceded by palpitations.  We will plan to evaluate the patient's Holter monitor results as she had multiple episodes of syncope while wearing this.    Pt seen and discussed with Dr. Hinojosa.    Lee Colvin MD  Cardiology Fellow, PGY-5  [phone]    ATTENDING ATTESTATION     Patient was seen and evaluated with Dr. Colvin. History was confirmed personally by me. Labs, imaging studies, EKGs, and telemetry were reviewed. Agree with assessment and plan as outlined above. The note has been edited by me as needed to produce a single, cohesive document.     Angelito Hinojosa MD  Staff Cardiologist

## 2020-01-14 NOTE — PROGRESS NOTES
"CARDIOLOGY CLINIC NOTE  01/14/2020    HPI:  The patient is a 49-year-old woman with a past medical history notable for hypertension, type 2 diabetes mellitus, and multiple admissions for non-STEMI's who is presenting to establish care with cardiology.    The patient's cardiac history dates back to at least 2004.  There is documentation that she underwent a coronary angiogram at that time.  More recently, the patient has had typical anginal chest pains which are noted by a chest tightness which radiates down her arm and feels numb in her arm.  They are not necessarily worse with exertion and have no clear relationship to her blood pressure.  However, this has led to multiple coronary angiograms in the setting of elevated troponin levels.  Most recently, this was performed at [facility] where she was found to have no evidence of coronary artery disease.  At the time, her blood pressure was greater than 180 systolic and her LVEDP was 26 mmHg. She was started on antihypertensives and reports compliance with these, however her blood pressure still remains elevated today.  She continues to have these intermittent chest pains.    She does carry a historical diagnosis of \"cardiomyopathy\" as well.  Based on the last 2 echocardiograms, there is no evidence for this.  I suspect that she was told she had a \"enlarged heart\".  However, this was meant to refer to left ventricular hypertrophy, rather than left ventricular dilation.  Her last LVIDD was 3.9 cm.  She further has no symptoms of heart failure including orthopnea, PND, or lower extremity swelling.    The patient is also being seen for syncope and presyncope.  The patient reports a history of multiple different forms of syncope.  1 form dates back to her her being a child when she would have a profound reaction to storms.  When at school, she would asked to use the restroom when thunder would, and would later be found on the floor in the hallway.  She would have " some prodromal symptoms associated with this.  She continues to have intermittent syncopal episodes with similar associated symptoms.  She also has episodes of syncope with orthostasis.  She finally also has intermittent episodes of syncope that are associated with palpitation.  The latter episodes are less clear to her and she is unable to describe these.  She recently had a Holter monitor placed by [facility] which she brought into clinic today.    ROS:  A complete 10-point ROS was negative except as above.    PAST MEDICAL HISTORY:  Past Medical History:   Diagnosis Date     Cardiomegaly      Cellulitis     b/l feet     COPD (chronic obstructive pulmonary disease) (H)      Diabetes (H)      Essential hypertension      Essential hypertension 5/3/2017     Gastroesophageal reflux disease      Hyperlipidemia      Langerhan's cell histiocytosis (H)      Moderate persistent asthma      NSTEMI (non-ST elevated myocardial infarction) (H)      Osteoarthritis     b/l ankles, feet, hands     Osteoarthritis      Poorly controlled type 2 diabetes mellitus with peripheral neuropathy (H) 10/25/2019     Sleep apnea      Urinary incontinence        PAST SURGICAL HISTORY:  Past Surgical History:   Procedure Laterality Date      SECTION      4 C/S     CHOLECYSTECTOMY      2014     EXCISE HIDRADENITIS (LOCATION)  2016     TONSILLECTOMY & ADENOIDECTOMY         FAMILY HISTORY:  Family History   Problem Relation Age of Onset     Arrhythmia Mother         pacemaker     Peripheral Vascular Disease Mother      Diabetes Mother      Hyperlipidemia Mother      Hypertension Mother      Asthma Mother      Cerebrovascular Disease Father         3 strokes     Aneurysm Father      Sleep Apnea Father      Hyperlipidemia Father      Asthma Father      Pacemaker Father      Cancer No family hx of        SOCIAL HISTORY:  Social History     Socioeconomic History     Marital status: Single     Spouse name: Not on file     Number of  children: Not on file     Years of education: Not on file     Highest education level: Not on file   Occupational History     Not on file   Social Needs     Financial resource strain: Not on file     Food insecurity:     Worry: Not on file     Inability: Not on file     Transportation needs:     Medical: Not on file     Non-medical: Not on file   Tobacco Use     Smoking status: Current Every Day Smoker     Packs/day: 0.50     Years: 10.00     Pack years: 5.00     Types: Cigarettes     Smokeless tobacco: Never Used     Tobacco comment: Smoked 1 pack per day for 9 years, then cut down to 5 per day   Substance and Sexual Activity     Alcohol use: Yes     Alcohol/week: 2.0 standard drinks     Types: 2 Standard drinks or equivalent per week     Drinks per session: 1 or 2     Comment: 2 weekly     Drug use: No     Sexual activity: Yes     Partners: Male     Birth control/protection: None   Lifestyle     Physical activity:     Days per week: Not on file     Minutes per session: Not on file     Stress: Not on file   Relationships     Social connections:     Talks on phone: Not on file     Gets together: Not on file     Attends [employer] service: Not on file     Active member of club or organization: Not on file     Attends meetings of clubs or organizations: Not on file     Relationship status: Not on file     Intimate partner violence:     Fear of current or ex partner: Not on file     Emotionally abused: Not on file     Physically abused: Not on file     Forced sexual activity: Not on file   Other Topics Concern     Not on file   Social History Narrative    ** Merged History Encounter **            MEDICATIONS:  Current Outpatient Medications   Medication     acetaminophen (TYLENOL) 325 MG tablet     albuterol (PROAIR HFA/PROVENTIL HFA/VENTOLIN HFA) 108 (90 Base) MCG/ACT inhaler     amLODIPine (NORVASC) 5 MG tablet     aspirin 81 MG EC tablet     carvedilol (COREG) 12.5 MG tablet     famotidine (PEPCID) 10 MG tablet      "fluticasone (FLONASE) 50 MCG/ACT nasal spray     ipratropium - albuterol 0.5 mg/2.5 mg/3 mL (DUONEB) 0.5-2.5 (3) MG/3ML neb solution     lisinopril (PRINIVIL/ZESTRIL) 20 MG tablet     nicotine (NICORETTE) 2 MG gum     nitroGLYcerin (NITROSTAT) 0.4 MG sublingual tablet     omeprazole (PRILOSEC) 20 MG DR capsule     order for DME     rosuvastatin (CRESTOR) 20 MG tablet     No current facility-administered medications for this visit.        ALLERGIES:     Allergies   Allergen Reactions     Iodine      Ciprofloxacin Rash     Iodine Other (See Comments), Itching and Rash     Burning sensation       PHYSICAL EXAM:  BP (!) 199/120 (BP Location: Right arm, Patient Position: Chair, Cuff Size: Adult Regular)   Pulse 83   Ht 1.651 m (5' 5\")   Wt 81.6 kg (180 lb)   LMP  (LMP Unknown)   SpO2 98%   BMI 29.95 kg/m    Gen: alert, interactive, NAD  HEENT: atraumatic, EOMI, MMM  Neck: supple, no JVD  CV: RRR, no m/r/g  Chest: CTAB, no wheezes or crackles  Abd: soft, NT, ND, +BS  Ext: no LE edema, 2+ peripheral pulses  Skin: warm and dry, no rashes on exposed surfaces  Neuro: alert and oriented, no focal deficits    LABS:    CMP  Last Comprehensive Metabolic Panel:  Sodium   Date Value Ref Range Status   11/27/2019 137.6 132.6 - 141.4 mmol/L Final     Potassium   Date Value Ref Range Status   11/27/2019 3.9 3.3 - 4.5 mmol/dL Final     Chloride   Date Value Ref Range Status   11/27/2019 101.0 98.0 - 110.0 mmol/L Final     Carbon Dioxide   Date Value Ref Range Status   11/27/2019 26.5 20.0 - 32.0 mmol/L Final     Glucose   Date Value Ref Range Status   11/27/2019 114.2 (H) 70.0 - 99.0 mg'dL Final     Urea Nitrogen   Date Value Ref Range Status   11/27/2019 5.7 (L) 7.0 - 19.0 mg/dL Final     Creatinine   Date Value Ref Range Status   11/27/2019 0.7 0.5 - 1.0 mg/dL Final     GFR Estimate   Date Value Ref Range Status   11/27/2019 >90 >60.0 mL/min/1.7 m2 Final     Calcium   Date Value Ref Range Status   11/27/2019 9.5 8.5 - 10.1 " mg/dL Final     Bilirubin Total   Date Value Ref Range Status   05/03/2017 <0.4 0.2 - 1.3 mg/dL Final     Alkaline Phosphatase   Date Value Ref Range Status   05/03/2017 53.5 31.7 - 110.5 U/L Final     ALT   Date Value Ref Range Status   05/03/2017 25.3 0.0 - 45.0 U/L Final     AST   Date Value Ref Range Status   05/03/2017 13.6 0.0 - 45.0 U/L Final       CBC  CBC RESULTS: No results for input(s): WBC, RBC, HGB, HCT, MCV, MCH, MCHC, RDW, PLT in the last 77572 hours.    TROP  No results found for: TROPI, TROPONIN, TROPR, TROPN    LIPIDS  Recent Labs   Lab Test 11/27/19  0922 05/03/17  1221   CHOL 155.4 262.8*   HDL 37.0* 36.4*   LDL 70 191*   TRIG 243.6* 178.7*   CHOLHDLRATIO 4.2 7.2*       TSH  TSH   Date Value Ref Range Status   05/03/2017 0.34 (L) 0.40 - 4.00 mU/L Final       HBA1C  Lab Results   Component Value Date    A1C 6.1 11/27/2019    A1C 6.2 05/03/2017       CARDIAC DATA:  EKG 10/14/20 NSR, severe LVH with repolarization     TTE 10/26/19 ([facility])  Normal LV and RV size and systolic function. No gross regional wall motion abnormalities identified. Moderate to severe LVH. LVEF 65- 70%. Grade 2 diastolic filling pattern (moderate increase in filling pressure).     Cath 11/26/18  SELECTIVE CORONARY ANGIOGRAPHY:  1.  Left main coronary artery:  The left main coronary artery is normal.  2.  Left anterior descending coronary artery:  The left anterior   descending coronary artery does not have any significant disease.  3.  Left circumflex coronary artery:  The left circumflex coronary artery   is normal.  4.  Right coronary artery:  The right coronary artery is a dominant vessel   without any significant disease.    LVEDP 26 mmHg    ASSESSMENT/PLAN:  In summary, this is a 49-year-old female who is presenting to cardiology to establish care for the following conditions:    1.  Hypertension with severe features: The patient has had multiple episodes of troponin elevations associated with elevated blood  pressures.  This has led to multiple coronary angiograms which showed non evidence of coronary artery disease.  I suspect that the patient's high blood pressure has not only led to the multiple episodes of non-STEMI's, but is also causing her chest pains.  In addition, this has likely led to impaired blood pressure response to standing which is at least partially contributing to her syncopal episodes.  Given her severe LVH on both EKG and echocardiogram, she needs aggressive care for her blood pressure.  We will plan today to increase her carvedilol to 25 mg twice per day.  We will have her follow-up in 6 weeks.  If she remains uncontrolled, suggest the addition of a diuretic to her lisinopril.    2.  Syncope: The patient has at least 3 forms of syncope.  One is clearly vasovagal and the second is clearly orthostatic.  It is unclear to me if there is a third component which was preceded by palpitations.  We will plan to evaluate the patient's Holter monitor results as she had multiple episodes of syncope while wearing this.    Pt seen and discussed with Dr. Hinojosa.    Lee Colvin MD  Cardiology Fellow, PGY-5  [phone]    ATTENDING ATTESTATION     Patient was seen and evaluated with Dr. Colvin. History was confirmed personally by me. Labs, imaging studies, EKGs, and telemetry were reviewed. Agree with assessment and plan as outlined above. The note has been edited by me as needed to produce a single, cohesive document.     Angelito Hinojosa MD  Staff Cardiologist

## 2020-01-14 NOTE — NURSING NOTE
Chief Complaint   Patient presents with     New Patient     New Pt Visit     Vitals were taken and medications were reconciled.  Cristy Collins  3:20 PM

## 2020-01-15 LAB — INTERPRETATION ECG - MUSE: NORMAL

## 2020-01-16 ENCOUNTER — OFFICE VISIT (OUTPATIENT)
Dept: FAMILY MEDICINE | Facility: CLINIC | Age: 50
End: 2020-01-16
Payer: MEDICAID

## 2020-01-16 ENCOUNTER — DOCUMENTATION ONLY (OUTPATIENT)
Dept: FAMILY MEDICINE | Facility: CLINIC | Age: 50
End: 2020-01-16

## 2020-01-16 VITALS
HEIGHT: 67 IN | RESPIRATION RATE: 16 BRPM | WEIGHT: 198 LBS | OXYGEN SATURATION: 100 % | TEMPERATURE: 98.4 F | BODY MASS INDEX: 31.08 KG/M2 | SYSTOLIC BLOOD PRESSURE: 208 MMHG | DIASTOLIC BLOOD PRESSURE: 153 MMHG | HEART RATE: 95 BPM

## 2020-01-16 DIAGNOSIS — K21.9 GASTROESOPHAGEAL REFLUX DISEASE WITHOUT ESOPHAGITIS: ICD-10-CM

## 2020-01-16 DIAGNOSIS — J06.9 VIRAL URI WITH COUGH: ICD-10-CM

## 2020-01-16 DIAGNOSIS — E78.5 HYPERLIPIDEMIA LDL GOAL <70: ICD-10-CM

## 2020-01-16 DIAGNOSIS — J45.40 MODERATE PERSISTENT ASTHMA WITHOUT COMPLICATION: ICD-10-CM

## 2020-01-16 DIAGNOSIS — J44.9 CHRONIC OBSTRUCTIVE PULMONARY DISEASE, UNSPECIFIED COPD TYPE (H): ICD-10-CM

## 2020-01-16 DIAGNOSIS — R52 PAIN: ICD-10-CM

## 2020-01-16 DIAGNOSIS — E11.42 TYPE 2 DIABETES MELLITUS WITH DIABETIC POLYNEUROPATHY, WITHOUT LONG-TERM CURRENT USE OF INSULIN (H): ICD-10-CM

## 2020-01-16 DIAGNOSIS — F33.1 MODERATE EPISODE OF RECURRENT MAJOR DEPRESSIVE DISORDER (H): ICD-10-CM

## 2020-01-16 DIAGNOSIS — I10 ESSENTIAL HYPERTENSION: Primary | ICD-10-CM

## 2020-01-16 DIAGNOSIS — M17.0 OSTEOARTHRITIS OF BOTH KNEES, UNSPECIFIED OSTEOARTHRITIS TYPE: ICD-10-CM

## 2020-01-16 RX ORDER — IPRATROPIUM BROMIDE AND ALBUTEROL SULFATE 2.5; .5 MG/3ML; MG/3ML
1 SOLUTION RESPIRATORY (INHALATION) EVERY 6 HOURS PRN
Qty: 30 VIAL | Refills: 3 | Status: SHIPPED | OUTPATIENT
Start: 2020-01-16

## 2020-01-16 RX ORDER — ACETAMINOPHEN 500 MG
1000 TABLET ORAL 3 TIMES DAILY
Qty: 180 TABLET | Refills: 3 | Status: SHIPPED | OUTPATIENT
Start: 2020-01-16

## 2020-01-16 RX ORDER — AMLODIPINE BESYLATE 5 MG/1
5 TABLET ORAL DAILY
Qty: 30 TABLET | Refills: 1 | Status: SHIPPED | OUTPATIENT
Start: 2020-01-16

## 2020-01-16 RX ORDER — ACETAMINOPHEN 325 MG/1
325-650 TABLET ORAL EVERY 6 HOURS PRN
Qty: 100 TABLET | Refills: 3 | Status: SHIPPED | OUTPATIENT
Start: 2020-01-16 | End: 2020-01-22

## 2020-01-16 RX ORDER — FLUTICASONE PROPIONATE 50 MCG
1-2 SPRAY, SUSPENSION (ML) NASAL DAILY
Qty: 16 G | Refills: 11 | Status: SHIPPED | OUTPATIENT
Start: 2020-01-16

## 2020-01-16 RX ORDER — LISINOPRIL 20 MG/1
20 TABLET ORAL DAILY
Qty: 30 TABLET | Refills: 1 | Status: SHIPPED | OUTPATIENT
Start: 2020-01-16

## 2020-01-16 RX ORDER — ROSUVASTATIN CALCIUM 20 MG/1
20 TABLET, COATED ORAL DAILY
Qty: 90 TABLET | Refills: 3 | Status: SHIPPED | OUTPATIENT
Start: 2020-01-16

## 2020-01-16 RX ORDER — BENZONATATE 100 MG/1
100 CAPSULE ORAL 3 TIMES DAILY PRN
Qty: 60 CAPSULE | Refills: 1 | Status: SHIPPED | OUTPATIENT
Start: 2020-01-16

## 2020-01-16 ASSESSMENT — ANXIETY QUESTIONNAIRES
5. BEING SO RESTLESS THAT IT IS HARD TO SIT STILL: NOT AT ALL
2. NOT BEING ABLE TO STOP OR CONTROL WORRYING: NEARLY EVERY DAY
1. FEELING NERVOUS, ANXIOUS, OR ON EDGE: MORE THAN HALF THE DAYS
3. WORRYING TOO MUCH ABOUT DIFFERENT THINGS: NEARLY EVERY DAY
IF YOU CHECKED OFF ANY PROBLEMS ON THIS QUESTIONNAIRE, HOW DIFFICULT HAVE THESE PROBLEMS MADE IT FOR YOU TO DO YOUR WORK, TAKE CARE OF THINGS AT HOME, OR GET ALONG WITH OTHER PEOPLE: NOT DIFFICULT AT ALL
7. FEELING AFRAID AS IF SOMETHING AWFUL MIGHT HAPPEN: NOT AT ALL
6. BECOMING EASILY ANNOYED OR IRRITABLE: NEARLY EVERY DAY
GAD7 TOTAL SCORE: 14

## 2020-01-16 ASSESSMENT — PATIENT HEALTH QUESTIONNAIRE - PHQ9: 5. POOR APPETITE OR OVEREATING: NEARLY EVERY DAY

## 2020-01-16 ASSESSMENT — MIFFLIN-ST. JEOR: SCORE: 1538.36

## 2020-01-16 NOTE — PROGRESS NOTES
"When opening a documentation only encounter, be sure to enter in \"Chief Complaint\" Forms and in \" Comments\" Title of form, description if needed.    Wilfrid Gil is a 49 year old  female  Form received via: Fax  Form now resides in: Provider Ready    Leesa Muniz CMA                  "

## 2020-01-16 NOTE — PROGRESS NOTES
[facility]       Ra Louise Sorenson is a 49 year old  who presents for   Chief Complaint   Patient presents with     Recheck Medication     refill all medications including neb supplies     Hyperlipidemia Follow-Up      Recommended Level of Therapy:High Intensity Statin (atorvastatin 40*-80 mg or rosuvastatin 20-40mg)    Rate your low fat/cholesterol diet?: not monitoring fat    Taking statin?  No, ran out of medications 5 days prior.    Other lipid medications/supplements?:  none  Lab Results   Component Value Date    CHOL 155.4 11/27/2019     Lab Results   Component Value Date    HDL 37.0 11/27/2019     Lab Results   Component Value Date    LDL 70 11/27/2019     Lab Results   Component Value Date    TRIG 243.6 11/27/2019     Lab Results   Component Value Date    CHOLHDLRATIO 4.2 11/27/2019       Hypertension Follow-up  <140/90 is goal    Outpatient blood pressures are not being checked.    Low Salt Diet: not monitoring salt    Daily NSAID Use?no     Did patient take their HTN pills today?  No been out of meds a week.   Carvadilol, Amlodipine, Lisinopril     Last Basic Metabolic Panel:  Lab Results   Component Value Date    .6 11/27/2019      Lab Results   Component Value Date    POTASSIUM 3.9 11/27/2019     Lab Results   Component Value Date    CHLORIDE 101.0 11/27/2019     Lab Results   Component Value Date    ABY 9.5 11/27/2019     Lab Results   Component Value Date    CO2 26.5 11/27/2019     Lab Results   Component Value Date    BUN 5.7 11/27/2019     Lab Results   Component Value Date    CR 0.7 11/27/2019     Lab Results   Component Value Date    .2 11/27/2019         Adherence and Exercise  Medication side effects: no  How often is a medication missed?   Exercise:No exercise      +++++++    Depression/Anxiety follow up      Your mood since your last visit: No change    Medication? none    Therapy? none    Exercise? no    Other associated symptoms : No hallucinations or delusions.    How is your  "sleep? Poor    New significant life event:No    Current substance use: None    Anxiety / Panic symptoms: No     Recent PHQ-9 Scores:     PHQ-9 SCORE 11/20/2019 11/27/2019 1/16/2020   PHQ-9 Total Score 19 18 12     Recent ANTWAN-7 Scores:      ANTWAN-7 SCORE 11/20/2019 11/27/2019 1/16/2020   Total Score 18 19 14         Today' sPHQ 9 Reviewed and it is poor, but improved from last time.  , Diabetes Follow-up      Patient is checking blood sugars: not at all         -Last A1C was   Lab Results   Component Value Date    A1C 6.1 11/27/2019    A1C 6.2 05/03/2017        Diabetic concerns: other -neuropathy.    Chest Pain or exercise related calf pain (claudication):no     Symptoms of hypoglycemia (low blood sugar): none     Paresthesias (numbness or burning in feet) or sores: Yes patient has polyneuropathy, has not had foot exam in quite some time.  Feet are becoming more tender.     Diabetic eye exam within the last year?: Yes      Problem, Medication and Allergy Lists were reviewed and updated if needed..    Patient is an established patient of this clinic..         Review of Systems:   Review of Systems   Constitutional: Negative for chills and fever.   Eyes: Negative for visual disturbance.   Respiratory: Positive for cough and shortness of breath.    Cardiovascular: Negative for chest pain.   Musculoskeletal: Positive for gait problem.   Psychiatric/Behavioral: Positive for dysphoric mood. The patient is nervous/anxious.             Physical Exam:     Vitals:    01/16/20 1011 01/16/20 1012   BP: (!) 200/150 (!) 208/153   BP Location: Right arm Left arm   Patient Position: Sitting Sitting   Cuff Size: Adult Regular Adult Regular   Pulse: 95    Resp: 16    Temp: 98.4  F (36.9  C)    TempSrc: Oral    SpO2: 100%    Weight: 89.8 kg (198 lb)    Height: 1.69 m (5' 6.54\")      Body mass index is 31.45 kg/m .  Vital signs normal except, extremely elevated blood pressures, severe range.     Physical Exam  Constitutional:       " General: She is not in acute distress.     Appearance: She is well-developed.   HENT:      Head: Normocephalic and atraumatic.   Eyes:      General: No scleral icterus.     Extraocular Movements: Extraocular movements intact.      Pupils: Pupils are equal, round, and reactive to light.   Cardiovascular:      Rate and Rhythm: Normal rate and regular rhythm.      Pulses: Normal pulses.      Heart sounds: Normal heart sounds. No murmur.   Pulmonary:      Effort: Pulmonary effort is normal. No respiratory distress.      Breath sounds: Normal breath sounds.   Neurological:      General: No focal deficit present.      Mental Status: She is alert and oriented to person, place, and time. Mental status is at baseline.      Comments: Decreased sensation below mid shin.  Bilaterally   Psychiatric:         Mood and Affect: Mood normal.         Behavior: Behavior normal.         Thought Content: Thought content normal.           Results:   No testing ordered today    Assessment and Plan        Wilfrid Gil was seen today for recheck medication.    This patient is a very complex patient with multiple medical comorbidities and recent hospitalizations who is following up today for medication recheck.  Concerned with patient adherence to medication regimen, as patient has been out of medications for the last few days, which has resulted in patient having extremely elevated blood pressures to severe range.    Diagnoses and all orders for this visit:    Essential hypertension  -     amLODIPine (NORVASC) 5 MG tablet; Take 1 tablet (5 mg) by mouth daily  -     lisinopril (PRINIVIL/ZESTRIL) 20 MG tablet; Take 1 tablet (20 mg) by mouth daily  Primary concern today is uncontrolled hypertension, pressures in the severe range, but patient without current symptoms of hypertensive emergency.  Denies headache, vision changes.  I suspect that Ms. Sorenson frequently has pressures at this level.  Will refill medications today and would like patient to  return in 2 weeks for blood pressure recheck, though patient notes that this is not feasible with her transportation and ability.  Initial decision making model, patient agreed to come back in 4 to 6 weeks for blood pressure recheck.  Noted the importance of having medications consistently to allow us to assess her blood pressure on medications to know if adjustments are needed.    Pain  Osteoarthritis of both knees, unspecified osteoarthritis type  -     acetaminophen (TYLENOL) 325 MG tablet; Take 1-2 tablets (325-650 mg) by mouth every 6 hours as needed for mild pain or fever  Patient with severe osteoarthritis, refilled Tylenol today.  Again discussed with patient that she is not a candidate for chronic opiate treatments.  Patient denied injections or referral to sports medicine today.    Moderate persistent asthma without complication  -     ipratropium - albuterol 0.5 mg/2.5 mg/3 mL (DUONEB) 0.5-2.5 (3) MG/3ML neb solution; Take 1 vial (3 mLs) by nebulization every 6 hours as needed for shortness of breath / dyspnea or wheezing  -     fluticasone (FLONASE) 50 MCG/ACT nasal spray; Spray 1-2 sprays into both nostrils daily  Chronic obstructive pulmonary disease, unspecified COPD type (H)  -     tiotropium-olodaterol (STIOLTO RESPIMAT) 2.5-2.5 MCG/ACT AERS; Inhale 2 puffs into the lungs daily    Patient with mixed picture of asthma versus COPD.  Given tobacco history, more likely COPD.  Given that, patient should be on long-acting beta agonist and muscarinic antagonist, and was started on Stiolto.  Will further discuss spirometry at next visit.    Gastroesophageal reflux disease without esophagitis  -     omeprazole (PRILOSEC) 20 MG DR capsule; Take 1 capsule (20 mg) by mouth daily  Refilled medication today.    Moderate episode of recurrent major depressive disorder (H)  -     BEHAVIORAL HEALTH REFERRAL ([facility] interal and external)  Patient with improving PHQ scores, but still qualifying for moderate to severe  depression.  No suicidal ideation today.  Amenable to establishing with psychology and referral made today..  We will continue to discuss serotonin specific reuptake inhibitor.    Viral URI with cough  -     benzonatate (TESSALON) 100 MG capsule; Take 1 capsule (100 mg) by mouth 3 times daily as needed for cough  Refill as needed cough medication, some concern that this is related to COPD, and expresses the patient.     Type 2 diabetes mellitus with diabetic polyneuropathy, without long-term current use of insulin (H)  -     ORTHO  REFERRAL - INTERNAL  Hyperlipidemia LDL goal <70  -     rosuvastatin (CRESTOR) 20 MG tablet; Take 1 tablet (20 mg) by mouth daily  Patient with type 2 diabetes, last A1c 6.1.  However she does have complications of polyneuropathy in bilateral feet.  Referred to podiatry, as patient may qualify for insoles/shoes.  Currently on 81 mg aspirin, statin, does not take daily NSAIDs otherwise.  At next visit, will plan microalbumin, though patient is already on lisinopril for hypertension which will yield some renal protection.    Through shared decision making, patient requested to follow-up in 2 weeks, but after further discussion, agreed to follow-up in 4 to 6 weeks.     There are no discontinued medications.    Options for treatment and follow-up care were reviewed with the patient. Wilfrid Sorenson  engaged in the decision making process and verbalized understanding of the options discussed and agreed with the final plan.    Rocky Ferguson DO, MA  Family Medicine PGY-3  [facility], [facility]

## 2020-01-17 ASSESSMENT — PATIENT HEALTH QUESTIONNAIRE - PHQ9: SUM OF ALL RESPONSES TO PHQ QUESTIONS 1-9: 12

## 2020-01-17 ASSESSMENT — ANXIETY QUESTIONNAIRES: GAD7 TOTAL SCORE: 14

## 2020-01-17 ASSESSMENT — ASTHMA QUESTIONNAIRES: ACT_TOTALSCORE: 12

## 2020-01-20 NOTE — TELEPHONE ENCOUNTER
DIAGNOSIS: Poorly controlled type 2 diabetes mellitus with peripheral neuropathy ; swelling and foot pain- bilateral- referred by Iliana Mesa- appt per pt   APPOINTMENT DATE: 2/10   NOTES STATUS DETAILS   OFFICE NOTE from referring provider N/A    OFFICE NOTE from other specialist Internal    DISCHARGE SUMMARY from hospital N/A    DISCHARGE REPORT from the ER N/A    OPERATIVE REPORT N/A    MEDICATION LIST Internal    MRI N/A    CT SCAN N/A    XRAYS (IMAGES & REPORTS) N/A

## 2020-01-20 NOTE — PATIENT INSTRUCTIONS
Ortho Referral      Hello! Pt is all set for ortho referral to podiatry. Scheduled on 2/10/20 with Dr. Rodriguez.       Thanks!

## 2020-01-21 NOTE — PROGRESS NOTES
Form has been completed by provider.     Form sent out via: Fax to [facility] at Fax Number: [phone]  Patient informed: N/A  Output date: January 21, 2020    Leesa Muniz CMA

## 2020-01-22 PROBLEM — E11.42 TYPE 2 DIABETES MELLITUS WITH DIABETIC POLYNEUROPATHY, WITHOUT LONG-TERM CURRENT USE OF INSULIN (H): Status: ACTIVE | Noted: 2019-10-25

## 2020-01-22 ASSESSMENT — ENCOUNTER SYMPTOMS
DYSPHORIC MOOD: 1
CHILLS: 0
COUGH: 1
SHORTNESS OF BREATH: 1
NERVOUS/ANXIOUS: 1
FEVER: 0

## 2020-01-27 ENCOUNTER — TELEPHONE (OUTPATIENT)
Dept: PSYCHOLOGY | Facility: CLINIC | Age: 50
End: 2020-01-27

## 2020-01-27 NOTE — TELEPHONE ENCOUNTER
Mental Health Referral:  Please schedule with Dr. Araya or Sudarshan      Please let patient know this is for primary care behavioral health services.  Therapists at our clinic are able to see patients for 8-12 sessions. If further assistance is needed, they will help the patient connect with ongoing services in the community.    If you are unable to reach the patient after two phone attempts, please send a letter and close the encounter.      Thank you!

## 2020-01-29 NOTE — TELEPHONE ENCOUNTER
This writer reached out to schedule  appointment. But patient declined at this time. She states that she plans to move out of state and will not need this service.        Nava Gay CMA  [facility]

## 2020-02-10 ENCOUNTER — PRE VISIT (OUTPATIENT)
Dept: ORTHOPEDICS | Facility: CLINIC | Age: 50
End: 2020-02-10

## 2020-02-18 ENCOUNTER — TELEPHONE (OUTPATIENT)
Dept: FAMILY MEDICINE | Facility: CLINIC | Age: 50
End: 2020-02-18

## 2020-02-18 NOTE — TELEPHONE ENCOUNTER
Moved back to [place] no longer a [facility] Patient. Per daughter Alicia.      Nava Gay CMA  [facility]

## 2020-03-11 PROBLEM — M25.562 CHRONIC PAIN OF BOTH KNEES: Status: RESOLVED | Noted: 2020-01-14 | Resolved: 2020-03-11

## 2020-03-11 PROBLEM — G89.29 CHRONIC PAIN OF BOTH KNEES: Status: RESOLVED | Noted: 2020-01-14 | Resolved: 2020-03-11

## 2020-03-11 PROBLEM — M25.561 CHRONIC PAIN OF BOTH KNEES: Status: RESOLVED | Noted: 2020-01-14 | Resolved: 2020-03-11

## 2020-09-15 ENCOUNTER — DOCUMENTATION ONLY (OUTPATIENT)
Dept: FAMILY MEDICINE | Facility: CLINIC | Age: 50
End: 2020-09-15

## 2020-09-15 NOTE — PROGRESS NOTES
"When opening a documentation only encounter, be sure to enter in \"Chief Complaint\" Forms and in \" Comments\" Title of form, description if needed.    Wilfrid Gil is a 50 year old  female  Form received via: Fax  Form now resides in: Provider Ready    Reena Mace MA    Form has been completed by provider.     Form sent out via: Fax to [facility] at Fax Number: [phone]  Patient informed: n/a  Output date: September 17, 2020    Reena Mace MA      **Please close the encounter**                    "

## 2021-06-05 NOTE — PATIENT INSTRUCTIONS
End of Shift Note: Medical    Significant Events: None  Pain Management: Last Pain Score: Numeric Rating Scale 0-10: 0 (06/05/21 0016)                                      Pain medication:  N/A.  Last given:  N/A   Diet: Consistent Carb Moderate (45-75 Gm/meal), Cardiac Diet      Bowel Function:  Normal  LBM:    Prior to admit  Activity:  Activity: Stood at bedside (06/05/21 0445)  Mobility Assistive Device:     Level of Assistance:  Level of Assistance: Supervision (06/05/21 0445)  Positioning: Positioning: Semi-fowlers (06/05/21 0445)  LDAs: peripheral IV   Patient's Anticipated Discharge Needs: Home with no anticipated needs (06/04/21 2107)  Expected Discharge Date: TBD  Expected Discharge Time: TBD              You were seen at the [facility] today.  You saw Dr. Angelito Hinojosa  Here are your Instructions:    1. Increase carvedilol (Coreg) to 25 mg twice daily-- take 2 pills in the morning and 2 pills at night of your current pills until you run out, then fill new prescription.  2. Follow up with Brielle Reid in 6-8 weeks.         If you have questions after this visit:  Send a [employer] message or contact Hillary Lugo LPN  Office:  [phone] option #1, then #3 & ask for Hillary (nurse line)  Fax:  [phone]  After Hours:  [phone] option #4 ask to speak to he on-call Cardiologist  Appointments:  [phone] option #1, then option #1